# Patient Record
Sex: MALE | Race: BLACK OR AFRICAN AMERICAN | Employment: UNEMPLOYED | URBAN - METROPOLITAN AREA
[De-identification: names, ages, dates, MRNs, and addresses within clinical notes are randomized per-mention and may not be internally consistent; named-entity substitution may affect disease eponyms.]

---

## 2018-12-04 ENCOUNTER — OFFICE VISIT (OUTPATIENT)
Dept: FAMILY MEDICINE CLINIC | Age: 48
End: 2018-12-04

## 2018-12-04 VITALS
OXYGEN SATURATION: 99 % | HEIGHT: 66 IN | WEIGHT: 143.8 LBS | TEMPERATURE: 97.6 F | DIASTOLIC BLOOD PRESSURE: 74 MMHG | RESPIRATION RATE: 12 BRPM | SYSTOLIC BLOOD PRESSURE: 97 MMHG | HEART RATE: 67 BPM | BODY MASS INDEX: 23.11 KG/M2

## 2018-12-04 DIAGNOSIS — F32.9 REACTIVE DEPRESSION: ICD-10-CM

## 2018-12-04 DIAGNOSIS — N18.6 ESRD ON DIALYSIS (HCC): ICD-10-CM

## 2018-12-04 DIAGNOSIS — Z99.2 ESRD ON DIALYSIS (HCC): ICD-10-CM

## 2018-12-04 DIAGNOSIS — G47.09 OTHER INSOMNIA: Primary | ICD-10-CM

## 2018-12-04 RX ORDER — CALCIUM ACETATE 667 MG/1
1 CAPSULE ORAL
COMMUNITY
End: 2022-08-16 | Stop reason: SDUPTHER

## 2018-12-04 RX ORDER — MECLIZINE HYDROCHLORIDE 25 MG/1
TABLET ORAL
COMMUNITY
End: 2019-02-25 | Stop reason: SDUPTHER

## 2018-12-04 RX ORDER — OMEPRAZOLE 40 MG/1
40 CAPSULE, DELAYED RELEASE ORAL DAILY
COMMUNITY
End: 2019-02-25 | Stop reason: SDUPTHER

## 2018-12-04 RX ORDER — HYDRALAZINE HYDROCHLORIDE 25 MG/1
25 TABLET, FILM COATED ORAL 3 TIMES DAILY
COMMUNITY
End: 2019-02-25 | Stop reason: SDUPTHER

## 2018-12-04 RX ORDER — TRAZODONE HYDROCHLORIDE 50 MG/1
50 TABLET ORAL
Qty: 90 TAB | Refills: 1 | Status: SHIPPED | OUTPATIENT
Start: 2018-12-04 | End: 2020-04-09 | Stop reason: SDUPTHER

## 2018-12-04 RX ORDER — CALCITRIOL 0.25 UG/1
0.25 CAPSULE ORAL DAILY
COMMUNITY
End: 2021-11-11

## 2018-12-04 RX ORDER — METOPROLOL TARTRATE 25 MG/1
TABLET, FILM COATED ORAL 2 TIMES DAILY
COMMUNITY
End: 2019-02-25 | Stop reason: SDUPTHER

## 2018-12-04 NOTE — PATIENT INSTRUCTIONS
Begin trazodone nightly, take one tablet every night 1 hour prior to your desired bedtime. You may not notice it immediately, but soon you will be sleeping much better. Recheck as needed.

## 2018-12-04 NOTE — PROGRESS NOTES
HISTORY OF PRESENT ILLNESS  Nanci Styles is a 45 y.o. male. Mr. Carlos Florentino is here to establish care. His step father passed away earlier this year and in July he had to go on dialysis. All of this has been overwheling and he says he has trouble sleeping because of it. He is depressed about all of this but denies suicidal thoughts. He has decreased hearing both ears, will  Be seeing ENT soon. He sees a podiatrist in 3 days for foot pain, and he has stomach issues and has an appt with Dr. Clifford Lei coming up. Review of Systems   Constitutional: Negative for chills and fever. HENT: Positive for hearing loss. Negative for sore throat and tinnitus. Eyes: Negative for blurred vision and double vision. Respiratory: Negative for cough and shortness of breath. Cardiovascular: Negative for chest pain, palpitations and orthopnea. Gastrointestinal: Positive for abdominal pain. Negative for constipation, nausea and vomiting. Musculoskeletal:        Foot pain   Neurological: Negative for headaches. Psychiatric/Behavioral: Positive for depression. Negative for hallucinations and suicidal ideas. The patient has insomnia. The patient is not nervous/anxious. Physical Exam   Constitutional: He is oriented to person, place, and time. He appears well-developed and well-nourished. Eyes: Pupils are equal, round, and reactive to light. Neck: Neck supple. Cardiovascular: Normal rate, regular rhythm and normal heart sounds. Pulmonary/Chest: Effort normal and breath sounds normal.   Abdominal: Soft. He exhibits no distension. There is no tenderness. There is no rebound and no guarding. Lymphadenopathy:     He has no cervical adenopathy. Neurological: He is alert and oriented to person, place, and time. Nursing note and vitals reviewed. ASSESSMENT and PLAN    ICD-10-CM ICD-9-CM    1. Other insomnia G47.09 780.52    2. Reactive depression F32.9 300.4    3.  [facility] ([facility] N18.6 585.6     Z99.2 V45.11

## 2018-12-04 NOTE — PROGRESS NOTES
Rm 3  Patient presents to the clinic c/o needing something to help him sleep at night. Has not been able to sleep x 5 days ago. Depression Screening:  PHQ over the last two weeks 12/4/2018   Little interest or pleasure in doing things Several days   Feeling down, depressed, irritable, or hopeless Several days   Total Score PHQ 2 2       Learning Assessment:  Learning Assessment 12/4/2018   PRIMARY LEARNER Patient   HIGHEST LEVEL OF EDUCATION - PRIMARY LEARNER  DID NOT GRADUATE HIGH SCHOOL   BARRIERS PRIMARY LEARNER NONE   CO-LEARNER CAREGIVER No   PRIMARY LANGUAGE ENGLISH   LEARNER PREFERENCE PRIMARY DEMONSTRATION   ANSWERED BY patient   RELATIONSHIP SELF       Abuse Screening:  No flowsheet data found. Health Maintenance reviewed and discussed per provider: yes     Coordination of Care:    1. Have you been to the ER, urgent care clinic since your last visit? Hospitalized since your last visit? no    2. Have you seen or consulted any other health care providers outside of the [address] [place] [address] since your last visit? Include any pap smears or colon screening.  No

## 2019-02-05 ENCOUNTER — HOSPITAL ENCOUNTER (OUTPATIENT)
Dept: LAB | Age: 49
Discharge: HOME | End: 2019-02-05
Payer: MEDICARE

## 2019-02-05 ENCOUNTER — OFFICE VISIT (OUTPATIENT)
Dept: FAMILY MEDICINE CLINIC | Age: 49
End: 2019-02-05

## 2019-02-05 VITALS
BODY MASS INDEX: 22.47 KG/M2 | DIASTOLIC BLOOD PRESSURE: 62 MMHG | TEMPERATURE: 97.9 F | OXYGEN SATURATION: 100 % | HEART RATE: 64 BPM | WEIGHT: 139.8 LBS | RESPIRATION RATE: 12 BRPM | SYSTOLIC BLOOD PRESSURE: 99 MMHG | HEIGHT: 66 IN

## 2019-02-05 DIAGNOSIS — R29.898 WEAKNESS OF BOTH LEGS: ICD-10-CM

## 2019-02-05 DIAGNOSIS — Z99.2 ESRD ON DIALYSIS (HCC): Primary | ICD-10-CM

## 2019-02-05 DIAGNOSIS — Z11.59 NEED FOR HEPATITIS C SCREENING TEST: ICD-10-CM

## 2019-02-05 DIAGNOSIS — R68.89 COLD INTOLERANCE: ICD-10-CM

## 2019-02-05 DIAGNOSIS — G89.29 CHRONIC MIDLINE LOW BACK PAIN WITHOUT SCIATICA: ICD-10-CM

## 2019-02-05 DIAGNOSIS — N18.6 ESRD ON DIALYSIS (HCC): ICD-10-CM

## 2019-02-05 DIAGNOSIS — F17.200 SMOKER: ICD-10-CM

## 2019-02-05 DIAGNOSIS — M54.50 CHRONIC MIDLINE LOW BACK PAIN WITHOUT SCIATICA: ICD-10-CM

## 2019-02-05 DIAGNOSIS — N18.6 ESRD ON DIALYSIS (HCC): Primary | ICD-10-CM

## 2019-02-05 DIAGNOSIS — Z99.2 ESRD ON DIALYSIS (HCC): ICD-10-CM

## 2019-02-05 LAB
BASOPHILS # BLD: 0.1 K/UL (ref 0–0.1)
BASOPHILS NFR BLD: 1 % (ref 0–2)
DIFFERENTIAL METHOD BLD: ABNORMAL
EOSINOPHIL # BLD: 0.4 K/UL (ref 0–0.4)
EOSINOPHIL NFR BLD: 5 % (ref 0–5)
ERYTHROCYTE [DISTWIDTH] IN BLOOD BY AUTOMATED COUNT: 15.7 % (ref 11.6–14.5)
HCT VFR BLD AUTO: 36.5 % (ref 36–48)
HGB BLD-MCNC: 11.8 G/DL (ref 13–16)
LYMPHOCYTES # BLD: 3 K/UL (ref 0.9–3.6)
LYMPHOCYTES NFR BLD: 31 % (ref 21–52)
MCH RBC QN AUTO: 30.2 PG (ref 24–34)
MCHC RBC AUTO-ENTMCNC: 32.3 G/DL (ref 31–37)
MCV RBC AUTO: 93.4 FL (ref 74–97)
MONOCYTES # BLD: 0.7 K/UL (ref 0.05–1.2)
MONOCYTES NFR BLD: 7 % (ref 3–10)
NEUTS SEG # BLD: 5.4 K/UL (ref 1.8–8)
NEUTS SEG NFR BLD: 56 % (ref 40–73)
PLATELET # BLD AUTO: 209 K/UL (ref 135–420)
PMV BLD AUTO: 9.9 FL (ref 9.2–11.8)
RBC # BLD AUTO: 3.91 M/UL (ref 4.7–5.5)
WBC # BLD AUTO: 9.6 K/UL (ref 4.6–13.2)

## 2019-02-05 PROCEDURE — 86803 HEPATITIS C AB TEST: CPT

## 2019-02-05 PROCEDURE — 85025 COMPLETE CBC W/AUTO DIFF WBC: CPT

## 2019-02-05 PROCEDURE — 80053 COMPREHEN METABOLIC PANEL: CPT

## 2019-02-05 PROCEDURE — 84443 ASSAY THYROID STIM HORMONE: CPT

## 2019-02-05 PROCEDURE — 84439 ASSAY OF FREE THYROXINE: CPT

## 2019-02-05 NOTE — PATIENT INSTRUCTIONS
We will call you about the lab results tomorrow. For the back, I will prescribe an analgesic cream that you apply to the painful area 2-3 times per day. If your insurance doesn't cover that we will try to get analgesic patchs. YOU NEED TO QUIT SMOKING. THIS IS AFFECTING YOUR ENDURANCE, LUNGS (SHORTNESS OF BREATH). YOU NEED TO DO THIS MORE THAN YOU NEED OXYGEN. I have prescribed the walker and the shower chair. I want you to get exercise, that's why the walker with a chair is a good idea. Schedule a \"medicare Wellness\" visit. This is not a regular physician office visit, this is all about updating your recommended health maintenance exams

## 2019-02-06 ENCOUNTER — TELEPHONE (OUTPATIENT)
Dept: FAMILY MEDICINE CLINIC | Age: 49
End: 2019-02-06

## 2019-02-06 DIAGNOSIS — E83.51 HYPOCALCEMIA: ICD-10-CM

## 2019-02-06 DIAGNOSIS — E87.5 HYPERKALEMIA: Primary | ICD-10-CM

## 2019-02-06 LAB
ALBUMIN SERPL-MCNC: 3.5 G/DL (ref 3.4–5)
ALBUMIN/GLOB SERPL: 1.1 {RATIO} (ref 0.8–1.7)
ALP SERPL-CCNC: 137 U/L (ref 45–117)
ALT SERPL-CCNC: 14 U/L (ref 16–61)
ANION GAP SERPL CALC-SCNC: 14 MMOL/L (ref 3–18)
AST SERPL-CCNC: 13 U/L (ref 15–37)
BILIRUB SERPL-MCNC: 0.4 MG/DL (ref 0.2–1)
BUN SERPL-MCNC: 95 MG/DL (ref 7–18)
BUN/CREAT SERPL: 5 (ref 12–20)
CALCIUM SERPL-MCNC: 6.8 MG/DL (ref 8.5–10.1)
CHLORIDE SERPL-SCNC: 98 MMOL/L (ref 100–108)
CO2 SERPL-SCNC: 22 MMOL/L (ref 21–32)
CREAT SERPL-MCNC: 21.1 MG/DL (ref 0.6–1.3)
GLOBULIN SER CALC-MCNC: 3.3 G/DL (ref 2–4)
GLUCOSE SERPL-MCNC: 105 MG/DL (ref 74–99)
HCV AB SER IA-ACNC: 0.02 INDEX
HCV AB SERPL QL IA: NEGATIVE
HCV COMMENT,HCGAC: NORMAL
POTASSIUM SERPL-SCNC: 7.1 MMOL/L (ref 3.5–5.1)
PROT SERPL-MCNC: 6.8 G/DL (ref 6.4–8.2)
SODIUM SERPL-SCNC: 134 MMOL/L (ref 136–145)
T4 FREE SERPL-MCNC: 0.9 NG/DL (ref 0.7–1.5)
TSH SERPL DL<=0.05 MIU/L-ACNC: 0.58 UIU/ML (ref 0.36–3.74)

## 2019-02-06 NOTE — TELEPHONE ENCOUNTER
Called with critical K+ level of 7.1.   Roseann Briones called him, advised him to go to lab and get repeat today since he doesn't have dialysis today

## 2019-02-06 NOTE — PROGRESS NOTES
Advise pt that his potassium level is very high, he needs to have it repeated. Is he going to dialysis today? He needs to have his kidney doctor repeat the labs and review these.  If he doesn't have dialysis, he needs to go to the hospital for repeat

## 2019-02-06 NOTE — PROGRESS NOTES
Called patient to inform about lab results. His sister answered, but looks like she is not on the release of information form so I let her know if he can give us a call when he gets back in the house. She stated she will do that due to the fact that they are waiting on the lab results.

## 2019-02-06 NOTE — PROGRESS NOTES
HISTORY OF PRESENT ILLNESS Carmen Conner is a 45 y.o. male. Mr. Lety Andrade is here with his sister asking for several things. He says he wants oxygen because he is short of breath, although his sats are 100%. He also smokes and refuses to quit. He c/o leg weakness, this has been going on for a long time, but seems to be getting worse. For this he wants a wheelchair and a chair for his shower. Finally, he says he is cold all the time and wants iron pills or something Review of Systems Constitutional: Negative for chills and fever. Eyes: Negative for blurred vision and double vision. Respiratory: Positive for shortness of breath. Cardiovascular: Negative for chest pain and palpitations. Gastrointestinal: Negative for abdominal pain, nausea and vomiting. Neurological: Positive for weakness. Negative for tingling, speech change and headaches. Endo/Heme/Allergies:  
     Cold intolerance Physical Exam  
Constitutional: He is oriented to person, place, and time. He appears well-developed and well-nourished. Eyes: Pupils are equal, round, and reactive to light. Neck: Neck supple. Cardiovascular: Normal rate and regular rhythm. Pulmonary/Chest: Effort normal and breath sounds normal. No respiratory distress. He has no wheezes. He has no rales. Abdominal: Soft. There is no tenderness. Musculoskeletal:  
Walks slowly, a little unsteady. Does appear to have leg weakness Lymphadenopathy:  
  He has no cervical adenopathy. Neurological: He is alert and oriented to person, place, and time. Psychiatric: He has a normal mood and affect. His behavior is normal.  
Nursing note and vitals reviewed. ASSESSMENT and PLAN 
  ICD-10-CM ICD-9-CM 1. ESRD on dialysis ([facility] N18.6 585.6 CBC WITH AUTOMATED DIFF  
 S87.7 C24.32 METABOLIC PANEL, COMPREHENSIVE 2. Chronic midline low back pain without sciatica M54.5 724.2  G89.29 338.29   
 3. Weakness of both legs R29.898 729.89 CBC WITH AUTOMATED DIFF 4. Smoker F17.200 305.1 CBC WITH AUTOMATED DIFF 5. Cold intolerance R68.89 780.99 CBC WITH AUTOMATED DIFF  
   METABOLIC PANEL, COMPREHENSIVE  
   TSH 3RD GENERATION  
   T4, FREE 6.  Need for hepatitis C screening test Z11.59 V73.89 HEPATITIS C AB

## 2019-02-06 NOTE — PROGRESS NOTES
Patient calling back. I let him know that his potassium level came back very high and will need to have it redone. Asked him if going to dialysis today but patient stated no, tomorrow. So advised patient to go to the hospital and have them repeat his potassium level before going to dialysis. Patient verbalized understanding and had no further questions.

## 2019-02-14 ENCOUNTER — TELEPHONE (OUTPATIENT)
Dept: FAMILY MEDICINE CLINIC | Age: 49
End: 2019-02-14

## 2019-02-14 NOTE — TELEPHONE ENCOUNTER
Stated that they need an order for a shower chair and walker sent to [facility]. Gave the fax number of [phone]. Also mentioned that the pain ointment/cream he was given, it did not get approved by his insurance so was wondering if there was any other cream he can be given.

## 2019-02-18 ENCOUNTER — TELEPHONE (OUTPATIENT)
Dept: FAMILY MEDICINE CLINIC | Age: 49
End: 2019-02-18

## 2019-02-18 DIAGNOSIS — M54.50 CHRONIC MIDLINE LOW BACK PAIN WITHOUT SCIATICA: ICD-10-CM

## 2019-02-18 DIAGNOSIS — G89.29 CHRONIC MIDLINE LOW BACK PAIN WITHOUT SCIATICA: ICD-10-CM

## 2019-02-18 DIAGNOSIS — R29.898 WEAKNESS OF BOTH LEGS: Primary | ICD-10-CM

## 2019-02-18 RX ORDER — LIDOCAINE 50 MG/G
PATCH TOPICAL
Qty: 30 EACH | Refills: 5 | Status: SHIPPED | OUTPATIENT
Start: 2019-02-18 | End: 2019-02-21 | Stop reason: SDUPTHER

## 2019-02-18 NOTE — TELEPHONE ENCOUNTER
Pt needs a shower chair script sent to [facility]. Pt also needs a different pain cream called in, pervious one not covered by insurance.

## 2019-02-20 ENCOUNTER — DOCUMENTATION ONLY (OUTPATIENT)
Dept: FAMILY MEDICINE CLINIC | Age: 49
End: 2019-02-20

## 2019-02-20 NOTE — PROGRESS NOTES
Faxed script for shower chair, demo sheet, and offie notes to [employer].      [employer] will be getting in contact with the patient

## 2019-02-21 RX ORDER — LIDOCAINE 50 MG/G
PATCH TOPICAL
Qty: 30 EACH | Refills: 5 | Status: SHIPPED | OUTPATIENT
Start: 2019-02-21 | End: 2019-02-27 | Stop reason: SDUPTHER

## 2019-02-21 NOTE — TELEPHONE ENCOUNTER
Requested Prescriptions     Pending Prescriptions Disp Refills    lidocaine (LIDODERM) 5 % 30 Each 5     Sig: Apply patch to the affected area for 12 hours a day and remove for 12 hours a day.

## 2019-02-26 RX ORDER — CALCITRIOL 0.25 UG/1
0.25 CAPSULE ORAL DAILY
OUTPATIENT
Start: 2019-02-26

## 2019-02-26 RX ORDER — METOPROLOL TARTRATE 25 MG/1
25 TABLET, FILM COATED ORAL 2 TIMES DAILY
Qty: 180 TAB | Refills: 1 | Status: SHIPPED | OUTPATIENT
Start: 2019-02-26 | End: 2019-09-05 | Stop reason: SDUPTHER

## 2019-02-26 RX ORDER — CALCIUM ACETATE 667 MG/1
CAPSULE ORAL
OUTPATIENT
Start: 2019-02-26

## 2019-02-26 RX ORDER — MECLIZINE HYDROCHLORIDE 25 MG/1
25 TABLET ORAL
Qty: 90 TAB | Refills: 0 | Status: SHIPPED | OUTPATIENT
Start: 2019-02-26 | End: 2019-09-05 | Stop reason: SDUPTHER

## 2019-02-26 RX ORDER — HYDRALAZINE HYDROCHLORIDE 25 MG/1
25 TABLET, FILM COATED ORAL 3 TIMES DAILY
Qty: 270 TAB | Refills: 1 | Status: SHIPPED | OUTPATIENT
Start: 2019-02-26 | End: 2019-09-05 | Stop reason: SDUPTHER

## 2019-02-26 RX ORDER — OMEPRAZOLE 40 MG/1
40 CAPSULE, DELAYED RELEASE ORAL DAILY
Qty: 90 CAP | Refills: 1 | Status: SHIPPED | OUTPATIENT
Start: 2019-02-26 | End: 2019-09-05 | Stop reason: SDUPTHER

## 2019-02-26 NOTE — TELEPHONE ENCOUNTER
I did not prescribe the medications that his kidney doctor prescribes for him (Rocaltrol and Phoslo)

## 2019-02-27 ENCOUNTER — TELEPHONE (OUTPATIENT)
Dept: FAMILY MEDICINE CLINIC | Age: 49
End: 2019-02-27

## 2019-02-27 RX ORDER — CALCITRIOL 0.25 UG/1
0.25 CAPSULE ORAL DAILY
OUTPATIENT
Start: 2019-02-27

## 2019-02-27 RX ORDER — CALCIUM ACETATE 667 MG/1
CAPSULE ORAL
OUTPATIENT
Start: 2019-02-27

## 2019-02-27 RX ORDER — HYDRALAZINE HYDROCHLORIDE 25 MG/1
25 TABLET, FILM COATED ORAL 3 TIMES DAILY
Qty: 270 TAB | Refills: 1 | Status: CANCELLED | OUTPATIENT
Start: 2019-02-27

## 2019-02-27 RX ORDER — LIDOCAINE 50 MG/G
PATCH TOPICAL
Qty: 30 EACH | Refills: 5 | Status: SHIPPED | OUTPATIENT
Start: 2019-02-27 | End: 2021-11-11 | Stop reason: SDUPTHER

## 2019-02-27 NOTE — TELEPHONE ENCOUNTER
Requested Prescriptions     Pending Prescriptions Disp Refills    lidocaine (LIDODERM) 5 % 30 Each 5     Sig: Apply patch to the affected area for 12 hours a day and remove for 12 hours a day.         CHANGED PHARMACY

## 2019-03-01 RX ORDER — CALCIUM ACETATE 667 MG/1
CAPSULE ORAL
OUTPATIENT
Start: 2019-03-01

## 2019-03-01 RX ORDER — CALCITRIOL 0.25 UG/1
0.25 CAPSULE ORAL DAILY
OUTPATIENT
Start: 2019-03-01

## 2019-04-26 ENCOUNTER — OFFICE VISIT (OUTPATIENT)
Dept: INTERNAL MEDICINE CLINIC | Age: 49
End: 2019-04-26

## 2019-04-26 ENCOUNTER — DOCUMENTATION ONLY (OUTPATIENT)
Dept: INTERNAL MEDICINE CLINIC | Age: 49
End: 2019-04-26

## 2019-04-26 VITALS
TEMPERATURE: 98.8 F | DIASTOLIC BLOOD PRESSURE: 75 MMHG | HEART RATE: 55 BPM | OXYGEN SATURATION: 98 % | BODY MASS INDEX: 23.14 KG/M2 | WEIGHT: 144 LBS | HEIGHT: 66 IN | SYSTOLIC BLOOD PRESSURE: 121 MMHG | RESPIRATION RATE: 14 BRPM

## 2019-04-26 DIAGNOSIS — Z12.5 SPECIAL SCREENING FOR MALIGNANT NEOPLASM OF PROSTATE: ICD-10-CM

## 2019-04-26 DIAGNOSIS — F17.210 CIGARETTE SMOKER: ICD-10-CM

## 2019-04-26 DIAGNOSIS — Z13.6 SCREENING FOR ISCHEMIC HEART DISEASE: ICD-10-CM

## 2019-04-26 DIAGNOSIS — Z12.11 COLON CANCER SCREENING: ICD-10-CM

## 2019-04-26 DIAGNOSIS — N18.6 ESRD ON DIALYSIS (HCC): Primary | ICD-10-CM

## 2019-04-26 DIAGNOSIS — Z99.2 ESRD ON DIALYSIS (HCC): Primary | ICD-10-CM

## 2019-04-26 RX ORDER — IBUPROFEN 200 MG
1 TABLET ORAL EVERY 24 HOURS
Qty: 60 PATCH | Refills: 0 | Status: SHIPPED | OUTPATIENT
Start: 2019-04-26 | End: 2021-11-11 | Stop reason: SDUPTHER

## 2019-04-26 RX ORDER — CALCIUM ACETATE 667 MG/1
CAPSULE ORAL
COMMUNITY
End: 2019-04-26

## 2019-04-26 NOTE — PROGRESS NOTES
HISTORY OF PRESENT ILLNESS  Christopher Mcguire is a 45 y.o. male. HPI  Mr. Daniela Kerr presents to establish care from [facility].   1) ESRD on dialysis - since July 2018. He reports he saw a kidney specialist at the hospital but he has not seen one since. - Follows with [facility] [address] - appt thursday 6/27/19. 2) Hearing loss - working with [facility] to obtain hearing ids. 3) Follows with podiatry for right foot callus. 4) Health maintenance: reports pneumonia vaccine obtained at dialysis center. He believes Tetanus is UTD. Review of Systems   Constitutional:        Feeling well. No c/o. Visit Vitals  /75 (BP 1 Location: Left arm, BP Patient Position: Sitting)   Pulse (!) 55   Temp 98.8 °F (37.1 °C) (Oral)   Resp 14   Ht 5' 6\" (1.676 m)   Wt 144 lb (65.3 kg)   SpO2 98%   BMI 23.24 kg/m²       Physical Exam   Constitutional: He is oriented to person, place, and time. He appears well-developed and well-nourished. No distress. HENT:   Head: Normocephalic and atraumatic. Right Ear: Tympanic membrane, external ear and ear canal normal.   Left Ear: Tympanic membrane, external ear and ear canal normal.   Nose: Nose normal.   Mouth/Throat: Uvula is midline, oropharynx is clear and moist and mucous membranes are normal. No oropharyngeal exudate, posterior oropharyngeal edema, posterior oropharyngeal erythema or tonsillar abscesses. Eyes: Pupils are equal, round, and reactive to light. Conjunctivae are normal. No scleral icterus. Neck: Neck supple. Cardiovascular: Normal rate, regular rhythm and normal heart sounds. Exam reveals no gallop. No murmur heard. Pulses:       Dorsalis pedis pulses are 2+ on the right side, and 2+ on the left side. Posterior tibial pulses are 2+ on the right side, and 2+ on the left side. No pedal edema. Pulmonary/Chest: Effort normal and breath sounds normal. No respiratory distress. He has no decreased breath sounds. He has no wheezes.  He has no rhonchi. He has no rales. Lymphadenopathy:        Head (right side): No submandibular and no tonsillar adenopathy present. Head (left side): No submandibular and no tonsillar adenopathy present. He has no cervical adenopathy. Right: No supraclavicular adenopathy present. Left: No supraclavicular adenopathy present. Neurological: He is alert and oriented to person, place, and time. Skin: Skin is warm and dry. Psychiatric: He has a normal mood and affect. His speech is normal.       ASSESSMENT and PLAN  Diagnoses and all orders for this visit:    1. ESRD on dialysis ([facility]  -     REFERRAL TO NEPHROLOGY    2. Cigarette smoker  -     nicotine (NICODERM CQ) 21 mg/24 hr; 1 Patch by TransDERmal route every twenty-four (24) hours. - Advised of total smoking cessation. He smokes ~6 cigs / day and is agreeable to cessation. Usage / AE's discussed. He would like to start max dosage. Do 1-2 months at this dosage, then we can decrease to medium dosage. Encouragement provided. - 5 minutes devoted to smoking cessation. 3. Colon cancer screening  -     OCCULT BLOOD IMMUNOASSAY,DIAGNOSTIC; Future    4. Screening for ischemic heart disease  -     LIPID PANEL; Future    5. Special screening for malignant neoplasm of prostate  -     PSA SCREENING (SCREENING); Future      Return for fasting lab. Patient Instructions   Check on your tetanus status and whether you have received this or not.

## 2019-04-29 ENCOUNTER — TELEPHONE (OUTPATIENT)
Dept: INTERNAL MEDICINE CLINIC | Age: 49
End: 2019-04-29

## 2019-07-12 ENCOUNTER — OFFICE VISIT (OUTPATIENT)
Dept: INTERNAL MEDICINE CLINIC | Age: 49
End: 2019-07-12

## 2019-07-12 VITALS
HEART RATE: 56 BPM | BODY MASS INDEX: 22.18 KG/M2 | HEIGHT: 66 IN | SYSTOLIC BLOOD PRESSURE: 187 MMHG | RESPIRATION RATE: 16 BRPM | TEMPERATURE: 98.6 F | OXYGEN SATURATION: 100 % | WEIGHT: 138 LBS | DIASTOLIC BLOOD PRESSURE: 103 MMHG

## 2019-07-12 DIAGNOSIS — N63.20 LEFT BREAST MASS: Primary | ICD-10-CM

## 2019-07-12 DIAGNOSIS — N62 GYNECOMASTIA, MALE: ICD-10-CM

## 2019-07-12 DIAGNOSIS — I10 ESSENTIAL HYPERTENSION: ICD-10-CM

## 2019-07-12 NOTE — PROGRESS NOTES
HISTORY OF PRESENT ILLNESS  Gustavo Jean is a 46 y.o. male. HPI  Mr. Cris Abbott presents for a left breast mass x 1 week. It is painful and just posterior to the nipple. Denies nipple discharge. - No new medications. No TRT. 2) HTN - uncontrolled. He reports he has been advised by his dialysis clinic to not take BP medication on his days of dialysis. He has not taken it today. Current Outpatient Medications   Medication Sig Dispense Refill    lidocaine (LIDODERM) 5 % Apply patch to the affected area for 12 hours a day and remove for 12 hours a day. 30 Each 5    hydrALAZINE (APRESOLINE) 25 mg tablet Take 1 Tab by mouth three (3) times daily. 270 Tab 1    meclizine (ANTIVERT) 25 mg tablet Take 1 Tab by mouth three (3) times daily as needed. 90 Tab 0    metoprolol tartrate (LOPRESSOR) 25 mg tablet Take 1 Tab by mouth two (2) times a day. 180 Tab 1    omeprazole (PRILOSEC) 40 mg capsule Take 1 Cap by mouth daily. 90 Cap 1    calcitRIOL (ROCALTROL) 0.25 mcg capsule Take 0.25 mcg by mouth daily.  calcium acetate (PHOSLO) 667 mg cap Take 1 Cap by mouth three (3) times daily (with meals).  traZODone (DESYREL) 50 mg tablet Take 1 Tab by mouth nightly. 90 Tab 1    nicotine (NICODERM CQ) 21 mg/24 hr 1 Patch by TransDERmal route every twenty-four (24) hours. 60 Patch 0         Review of Systems   Neurological: Negative for dizziness and headaches. Visit Vitals  BP (!) 187/103 (BP 1 Location: Left arm, BP Patient Position: Sitting)   Pulse (!) 56   Temp 98.6 °F (37 °C) (Oral)   Resp 16   Ht 5' 6\" (1.676 m)   Wt 138 lb (62.6 kg)   SpO2 100%   BMI 22.27 kg/m²   - BP repeated manually with consistent reading to above. Physical Exam   Constitutional: He is oriented to person, place, and time. He appears well-developed and well-nourished. No distress. HENT:   Head: Normocephalic and atraumatic.    Right Ear: Tympanic membrane, external ear and ear canal normal.   Left Ear: Tympanic membrane, external ear and ear canal normal.   Nose: Nose normal.   Mouth/Throat: Uvula is midline, oropharynx is clear and moist and mucous membranes are normal. No oropharyngeal exudate, posterior oropharyngeal edema, posterior oropharyngeal erythema or tonsillar abscesses. Eyes: Pupils are equal, round, and reactive to light. Conjunctivae are normal. No scleral icterus. Neck: Neck supple. Cardiovascular: Normal rate, regular rhythm and normal heart sounds. Exam reveals no gallop. No murmur heard. Pulses:       Dorsalis pedis pulses are 2+ on the right side, and 2+ on the left side. Posterior tibial pulses are 2+ on the right side, and 2+ on the left side. No pedal edema. Pulmonary/Chest: Effort normal and breath sounds normal. No respiratory distress. He has no decreased breath sounds. He has no wheezes. He has no rhonchi. He has no rales. Right breast exhibits no nipple discharge. Left breast exhibits no nipple discharge. Breasts are asymmetrical.   Left breast with symmetrical glandular tissue palpated just posterior to areola. Not appreciated on the right breast to any degree. Genitourinary: Right testis shows no mass and no tenderness. Left testis shows no mass and no tenderness. Genitourinary Comments: No palpable testicular mass. Patient's female  remains present in room during exam.    Lymphadenopathy:        Head (right side): No submandibular and no tonsillar adenopathy present. Head (left side): No submandibular and no tonsillar adenopathy present. He has no cervical adenopathy. Right: No supraclavicular adenopathy present. Left: No supraclavicular adenopathy present. Neurological: He is alert and oriented to person, place, and time. Skin: Skin is warm and dry. Psychiatric: He has a normal mood and affect. His speech is normal.       ASSESSMENT and PLAN  Diagnoses and all orders for this visit:    1. Left breast mass  -     KARINA MAMMO LT DX INCL CAD;  Future  - US BREAST LT LIMITED=<3 QUAD; Future    2. Gynecomastia, male  -     KARINA MAMMO LT DX INCL CAD; Future  -     US BREAST LT LIMITED=<3 QUAD; Future  -     HCG QL SERUM; Future  -     FOLLICLE STIMULATING HORMONE; Future  -     LUTEINIZING HORMONE; Future  -     TESTOSTERONE, TOTAL, ADULT MALE; Future  -     ESTRADIOL; Future    3. Essential hypertension  - Patient advised to take medication when he gets home today. - Also advised to confirm BP med instructions with nephrologist. He will check on this. Follow-up and Dispositions    · Return in about 2 weeks (around 7/26/2019) for follow-up results.

## 2019-07-12 NOTE — PROGRESS NOTES
ROOM # 11    Oniel Condon presents today for   Chief Complaint   Patient presents with    Breast Mass     left x 1 week       Oniel Condon preferred language for health care discussion is english/other. Is someone accompanying this pt? Yes mother    Is the patient using any DME equipment during [address]? No    Depression Screening:  3 most recent [facility] Screens 7/12/2019 4/26/2019 2/5/2019 12/4/2018   Little interest or pleasure in doing things Not at all Not at all Not at all Several days   Feeling down, depressed, irritable, or hopeless Not at all Not at all Not at all Several days   Total Score PHQ 2 0 0 0 2       Learning Assessment:  Learning Assessment 4/26/2019 12/4/2018   PRIMARY LEARNER Patient Patient   HIGHEST LEVEL OF EDUCATION - PRIMARY LEARNER  DID NOT GRADUATE HIGH SCHOOL DID NOT GRADUATE HIGH SCHOOL   BARRIERS PRIMARY LEARNER NONE NONE   CO-LEARNER CAREGIVER No No   PRIMARY LANGUAGE ENGLISH ENGLISH   LEARNER PREFERENCE PRIMARY LISTENING DEMONSTRATION   ANSWERED BY patient patient   RELATIONSHIP SELF SELF       Abuse Screening:  No flowsheet data found. Fall Risk  No flowsheet data found. Health Maintenance reviewed and discussed per provider. Yes    Oniel Condon is due for   Health Maintenance Due   Topic Date Due    Pneumococcal 0-64 years (1 of 3 - PCV13) 10/04/1974    DTaP/Tdap/Td series (1 - Tdap) 10/04/1989    Shingrix Vaccine Age 50> (1 of 2) 10/04/2018    FOBT Q 1 YEAR AGE 50-75  10/04/2018    MEDICARE YEARLY EXAM  12/04/2018     Please order/place referral if appropriate. Advance Directive:  1. Do you have an advance directive in place? Patient Reply: NO    2. If not, would you like material regarding how to put one in place? Patient Reply: NO    Coordination of Care:  1. Have you been to the ER, urgent care clinic since your last visit? Hospitalized since your last visit? NO    2.  Have you seen or consulted any other health care providers outside of the [address] [place] [address] since your last visit? Include any pap smears or colon screening.  NO

## 2019-07-18 ENCOUNTER — HOSPITAL ENCOUNTER (OUTPATIENT)
Dept: LAB | Age: 49
Discharge: HOME | End: 2019-07-18
Payer: MEDICARE

## 2019-07-18 DIAGNOSIS — N62 GYNECOMASTIA, MALE: ICD-10-CM

## 2019-07-18 LAB — HCG SERPL QL: NEGATIVE

## 2019-07-18 PROCEDURE — 83002 ASSAY OF GONADOTROPIN (LH): CPT

## 2019-07-18 PROCEDURE — 84403 ASSAY OF TOTAL TESTOSTERONE: CPT

## 2019-07-18 PROCEDURE — 83001 ASSAY OF GONADOTROPIN (FSH): CPT

## 2019-07-18 PROCEDURE — 36415 COLL VENOUS BLD VENIPUNCTURE: CPT

## 2019-07-18 PROCEDURE — 82670 ASSAY OF TOTAL ESTRADIOL: CPT

## 2019-07-18 PROCEDURE — 84703 CHORIONIC GONADOTROPIN ASSAY: CPT

## 2019-07-19 LAB
ESTRADIOL SERPL-MCNC: 26.3 PG/ML (ref 7.6–42.6)
TESTOST SERPL-MCNC: 658 NG/DL (ref 264–916)

## 2019-07-20 LAB
FSH SERPL-ACNC: 12 MIU/ML
LH SERPL-ACNC: 20.5 MIU/ML

## 2019-07-22 ENCOUNTER — TELEPHONE (OUTPATIENT)
Dept: INTERNAL MEDICINE CLINIC | Age: 49
End: 2019-07-22

## 2019-07-22 DIAGNOSIS — N62 GYNECOMASTIA: Primary | ICD-10-CM

## 2019-07-22 DIAGNOSIS — D49.7 NEOPLASM OF UNSPECIFIED BEHAVIOR OF ENDOCRINE GLANDS AND OTHER PARTS OF NERVOUS SYSTEM: ICD-10-CM

## 2019-07-22 NOTE — PROGRESS NOTES
Need labs to be ordered in [facility], so they can be faxed over.

## 2019-07-23 NOTE — TELEPHONE ENCOUNTER
----- Message from Asher Myers sent at 7/22/2019  4:34 PM EDT -----  Need labs to be ordered in [facility], so they can be faxed over.

## 2019-08-02 ENCOUNTER — OFFICE VISIT (OUTPATIENT)
Dept: INTERNAL MEDICINE CLINIC | Age: 49
End: 2019-08-02

## 2019-08-02 ENCOUNTER — HOSPITAL ENCOUNTER (OUTPATIENT)
Dept: LAB | Age: 49
Discharge: HOME | End: 2019-08-02
Payer: MEDICARE

## 2019-08-02 VITALS
RESPIRATION RATE: 16 BRPM | TEMPERATURE: 98.4 F | BODY MASS INDEX: 21.38 KG/M2 | WEIGHT: 133 LBS | DIASTOLIC BLOOD PRESSURE: 88 MMHG | SYSTOLIC BLOOD PRESSURE: 166 MMHG | HEIGHT: 66 IN | OXYGEN SATURATION: 98 % | HEART RATE: 67 BPM

## 2019-08-02 DIAGNOSIS — Z12.5 PROSTATE CANCER SCREENING: ICD-10-CM

## 2019-08-02 DIAGNOSIS — N62 GYNECOMASTIA: Primary | ICD-10-CM

## 2019-08-02 DIAGNOSIS — R63.4 ABNORMAL WEIGHT LOSS: ICD-10-CM

## 2019-08-02 PROCEDURE — 87389 HIV-1 AG W/HIV-1&-2 AB AG IA: CPT

## 2019-08-02 PROCEDURE — 84443 ASSAY THYROID STIM HORMONE: CPT

## 2019-08-02 PROCEDURE — 84153 ASSAY OF PSA TOTAL: CPT

## 2019-08-02 PROCEDURE — 84439 ASSAY OF FREE THYROXINE: CPT

## 2019-08-02 NOTE — PATIENT INSTRUCTIONS
Call [facility] at [phone] and ask for Central Scheduling so that you can schedule the mammogram and ultrasound.

## 2019-08-02 NOTE — PROGRESS NOTES
HISTORY OF PRESENT ILLNESS  Sarthak Mcdonald is a 46 y.o. male. HPI  Mr. Austyn Bell presents for follow-up gynecomastia and lab results. He also c/o weight loss as of late. He has been advised to consume \"Booster drinks\" via his dialysis clinic. 1) Gynecomastia - improved discomfort of left breast.  - Elevated LH with elevated T testosterone. Negative HCG. No estradiol elevation.  - Has not yet completed mammogram/breast US. Results for orders placed or performed during the hospital encounter of 07/18/19   HCG QL SERUM   Result Value Ref Range    HCG, Ql. NEGATIVE      FOLLICLE STIMULATING HORMONE   Result Value Ref Range    FSH 12.0 mIU/mL   LUTEINIZING HORMONE   Result Value Ref Range    Luteinizing hormone 20.5 mIU/mL   TESTOSTERONE, TOTAL, ADULT MALE   Result Value Ref Range    Testosterone 658 264 - 916 ng/dL   ESTRADIOL   Result Value Ref Range    Estradiol 26.3 7.6 - 42.6 pg/mL       2) Weight loss - c/o decreased appetite. Denies chronic cough or persistent pain. 3) HTN - med not taken today d/t dialysis. Review of Systems   Constitutional: Positive for weight loss. Respiratory: Negative for cough. Visit Vitals  /88 (BP 1 Location: Right arm, BP Patient Position: Sitting)   Pulse 67   Temp 98.4 °F (36.9 °C) (Oral)   Resp 16   Ht 5' 6\" (1.676 m)   Wt 133 lb (60.3 kg)   SpO2 98%   BMI 21.47 kg/m²     Wt Readings from Last 3 Encounters:   08/02/19 133 lb (60.3 kg)   07/12/19 138 lb (62.6 kg)   04/26/19 144 lb (65.3 kg)   2/5/2019 - 139 lbs  12/4/2018 - 143 lbs    BP Readings from Last 3 Encounters:   08/02/19 166/88   07/12/19 (!) 187/103   04/26/19 121/75       Physical Exam   Constitutional: He is oriented to person, place, and time. He appears well-developed and well-nourished. No distress. HENT:   Head: Normocephalic and atraumatic.    Right Ear: Tympanic membrane, external ear and ear canal normal.   Left Ear: Tympanic membrane, external ear and ear canal normal.   Nose: Nose normal. Mouth/Throat: Uvula is midline, oropharynx is clear and moist and mucous membranes are normal. No oropharyngeal exudate, posterior oropharyngeal edema, posterior oropharyngeal erythema or tonsillar abscesses. Eyes: Pupils are equal, round, and reactive to light. Conjunctivae are normal. No scleral icterus. Neck: Neck supple. Cardiovascular: Normal rate, regular rhythm and normal heart sounds. Exam reveals no gallop. No murmur heard. Pulses:       Dorsalis pedis pulses are 2+ on the right side, and 2+ on the left side. Posterior tibial pulses are 2+ on the right side, and 2+ on the left side. No pedal edema. Pulmonary/Chest: Effort normal and breath sounds normal. No respiratory distress. He has no decreased breath sounds. He has no wheezes. He has no rhonchi. He has no rales. Left breast with symmetrical glandular tissue palpated just posterior to areola - seems mildly smaller than previous. Less TTP. No similar tissue appreciated R breast.   Lymphadenopathy:        Head (right side): No submandibular and no tonsillar adenopathy present. Head (left side): No submandibular and no tonsillar adenopathy present. He has no cervical adenopathy. Right: No supraclavicular adenopathy present. Left: No supraclavicular adenopathy present. Neurological: He is alert and oriented to person, place, and time. Skin: Skin is warm and dry. Psychiatric: He has a normal mood and affect. His speech is normal.       ASSESSMENT and PLAN  Diagnoses and all orders for this visit:    1. Gynecomastia  -     TSH 3RD GENERATION; Future  -     T4, FREE; Future   - Negative eval 2/2019 but will repeat. -     US SCROTUM/TESTICLES; Future  -     REFERRAL TO ENDOCRINOLOGY  - Complete mammogram/US - orders placed last visit. [facility] contact info given to schedule. 2. Abnormal weight loss   -     TSH 3RD GENERATION; Future  -     PSA SCREENING (SCREENING);  Future  -     HIV 1/2 AG/AB, 4TH GENERATION,W RFLX CONFIRM; Future  - T4, FREE; Future  - Proceed with recommend \"booster\" drinks via dialysis clinic. Closely monitor. Advised against OTC products as he must be careful to avoid excessive protein consumption. He agrees. Patient Instructions   Call [facility] at [phone] and ask for Central Scheduling so that you can schedule the mammogram and ultrasound.

## 2019-08-02 NOTE — PROGRESS NOTES
1. Have you been to the ER, urgent care clinic since your last visit? Hospitalized since your last visit? No    2. Have you seen or consulted any other health care providers outside of the [address] [place] [address] since your last visit? Include any pap smears or colon screening. No     Patient presents in office today for routine care.   Patient concerns: loss of appetite, lab results

## 2019-08-05 ENCOUNTER — HOSPITAL ENCOUNTER (OUTPATIENT)
Dept: LAB | Age: 49
Discharge: HOME | End: 2019-08-05

## 2019-08-05 DIAGNOSIS — N62 GYNECOMASTIA: ICD-10-CM

## 2019-08-05 DIAGNOSIS — R63.4 ABNORMAL WEIGHT LOSS: ICD-10-CM

## 2019-08-05 LAB
PSA SERPL-MCNC: 1.3 NG/ML (ref 0–4)
T4 FREE SERPL-MCNC: 1.1 NG/DL (ref 0.7–1.5)
TSH SERPL DL<=0.05 MIU/L-ACNC: 0.84 UIU/ML (ref 0.36–3.74)

## 2019-08-07 LAB
HIV 1+2 AB+HIV1 P24 AG SERPL QL IA: NONREACTIVE
HIV12 RESULT COMMENT, HHIVC: NORMAL

## 2019-08-08 NOTE — PROGRESS NOTES
Within acceptable limits. Please notify patient his lab was negative. He needs to complete the ultrasounds at [facility] and I referred him to endocrine at his last visit. Has he scheduled or heard about these?

## 2019-08-08 NOTE — PROGRESS NOTES
Attempted to contact pt at  number, no answer. m for pt to return call to office at [phone] . Will continue to try to contact pt.

## 2019-08-14 NOTE — PROGRESS NOTES
Attempted to contact patient at  number, no answer. Lvm for patient to return call to office at [phone]. I have been unable to reach this patient by phone. A letter is being sent to the last known home address.

## 2019-09-10 RX ORDER — OMEPRAZOLE 40 MG/1
40 CAPSULE, DELAYED RELEASE ORAL DAILY
Qty: 90 CAP | Refills: 0 | Status: SHIPPED | OUTPATIENT
Start: 2019-09-10 | End: 2019-11-14 | Stop reason: SDUPTHER

## 2019-09-10 RX ORDER — HYDRALAZINE HYDROCHLORIDE 25 MG/1
25 TABLET, FILM COATED ORAL 3 TIMES DAILY
Qty: 270 TAB | Refills: 0 | Status: SHIPPED | OUTPATIENT
Start: 2019-09-10 | End: 2019-11-14 | Stop reason: SDUPTHER

## 2019-09-10 RX ORDER — MECLIZINE HYDROCHLORIDE 25 MG/1
25 TABLET ORAL
Qty: 90 TAB | Refills: 0 | Status: SHIPPED | OUTPATIENT
Start: 2019-09-10 | End: 2019-09-20 | Stop reason: SDUPTHER

## 2019-09-10 RX ORDER — CALCITRIOL 0.25 UG/1
0.25 CAPSULE ORAL DAILY
Qty: 90 CAP | Refills: 0 | OUTPATIENT
Start: 2019-09-10

## 2019-09-10 RX ORDER — CALCIUM ACETATE 667 MG/1
1 CAPSULE ORAL
OUTPATIENT
Start: 2019-09-10

## 2019-09-10 RX ORDER — METOPROLOL TARTRATE 25 MG/1
25 TABLET, FILM COATED ORAL 2 TIMES DAILY
Qty: 180 TAB | Refills: 0 | Status: SHIPPED | OUTPATIENT
Start: 2019-09-10 | End: 2019-11-14 | Stop reason: SDUPTHER

## 2019-09-11 NOTE — TELEPHONE ENCOUNTER
Who prescribed the phoslo and calitriol? Renal?    Sent electronically other medications. Requested Prescriptions     Signed Prescriptions Disp Refills    meclizine (ANTIVERT) 25 mg tablet 90 Tab 0     Sig: Take 1 Tab by mouth three (3) times daily as needed for Dizziness. Authorizing Provider: David Cason hydrALAZINE (APRESOLINE) 25 mg tablet 270 Tab 0     Sig: Take 1 Tab by mouth three (3) times daily. Authorizing Provider: Will CORTEZ    metoprolol tartrate (LOPRESSOR) 25 mg tablet 180 Tab 0     Sig: Take 1 Tab by mouth two (2) times a day. Authorizing Provider: Will CORTEZ    omeprazole (PRILOSEC) 40 mg capsule 90 Cap 0     Sig: Take 1 Cap by mouth daily. Authorizing Provider: Capo Haq     Refused Prescriptions Disp Refills    calcium acetate (PHOSLO) 667 mg cap       Sig: Take 1 Cap by mouth three (3) times daily (with meals). Refused By: Capo Haq     Reason for Refusal: other    calcitRIOL (ROCALTROL) 0.25 mcg capsule 90 Cap 0     Sig: Take 1 Cap by mouth daily.      Refused By: Capo Haq     Reason for Refusal: other

## 2019-09-16 NOTE — TELEPHONE ENCOUNTER
Attempted to contact patient at  number, spoke with patient mother who reports patient is not available. Left message for patient to return call to office at [phone]. Will continue to try to contact patient.

## 2019-09-19 NOTE — TELEPHONE ENCOUNTER
Attempted to contact patient at  number, no answer. Lvm for patient to return call to office at [phone]. Will continue to try to contact patient.

## 2019-09-20 RX ORDER — MECLIZINE HYDROCHLORIDE 25 MG/1
TABLET ORAL
Qty: 90 TAB | Refills: 0 | Status: SHIPPED | OUTPATIENT
Start: 2019-09-20 | End: 2019-10-15 | Stop reason: SDUPTHER

## 2019-10-15 RX ORDER — MECLIZINE HYDROCHLORIDE 25 MG/1
TABLET ORAL
Qty: 90 TAB | Refills: 0 | Status: SHIPPED | OUTPATIENT
Start: 2019-10-15 | End: 2019-11-14 | Stop reason: SDUPTHER

## 2019-11-14 RX ORDER — OMEPRAZOLE 40 MG/1
CAPSULE, DELAYED RELEASE ORAL
Qty: 90 CAP | Refills: 0 | Status: SHIPPED | OUTPATIENT
Start: 2019-11-14 | End: 2020-04-09 | Stop reason: SDUPTHER

## 2019-11-14 RX ORDER — HYDRALAZINE HYDROCHLORIDE 25 MG/1
TABLET, FILM COATED ORAL
Qty: 270 TAB | Refills: 0 | Status: SHIPPED | OUTPATIENT
Start: 2019-11-14 | End: 2020-04-09 | Stop reason: SDUPTHER

## 2019-11-14 RX ORDER — METOPROLOL TARTRATE 25 MG/1
TABLET, FILM COATED ORAL
Qty: 180 TAB | Refills: 0 | Status: SHIPPED | OUTPATIENT
Start: 2019-11-14 | End: 2020-04-09 | Stop reason: SDUPTHER

## 2019-11-15 NOTE — TELEPHONE ENCOUNTER
Attempted to reach pt to advise a courtesy refill was done, he will need appt for future refills, no answer, left vm for return call

## 2020-04-10 RX ORDER — TRAZODONE HYDROCHLORIDE 50 MG/1
50 TABLET ORAL
Qty: 90 TAB | Refills: 0 | Status: SHIPPED | OUTPATIENT
Start: 2020-04-10

## 2020-04-10 RX ORDER — HYDRALAZINE HYDROCHLORIDE 25 MG/1
TABLET, FILM COATED ORAL
Qty: 270 TAB | Refills: 0 | Status: SHIPPED | OUTPATIENT
Start: 2020-04-10 | End: 2021-05-25

## 2020-04-10 RX ORDER — MECLIZINE HYDROCHLORIDE 25 MG/1
TABLET ORAL
Qty: 90 TAB | Refills: 0 | Status: SHIPPED | OUTPATIENT
Start: 2020-04-10 | End: 2021-11-11

## 2020-04-10 RX ORDER — METOPROLOL TARTRATE 25 MG/1
TABLET, FILM COATED ORAL
Qty: 180 TAB | Refills: 0 | Status: SHIPPED | OUTPATIENT
Start: 2020-04-10 | End: 2022-08-16 | Stop reason: SDUPTHER

## 2020-04-10 RX ORDER — OMEPRAZOLE 40 MG/1
CAPSULE, DELAYED RELEASE ORAL
Qty: 90 CAP | Refills: 0 | Status: SHIPPED | OUTPATIENT
Start: 2020-04-10

## 2021-05-25 ENCOUNTER — OFFICE VISIT (OUTPATIENT)
Dept: INTERNAL MEDICINE CLINIC | Age: 51
End: 2021-05-25
Payer: MEDICARE

## 2021-05-25 VITALS
HEIGHT: 66 IN | BODY MASS INDEX: 21.92 KG/M2 | DIASTOLIC BLOOD PRESSURE: 110 MMHG | SYSTOLIC BLOOD PRESSURE: 190 MMHG | RESPIRATION RATE: 18 BRPM | WEIGHT: 136.4 LBS | HEART RATE: 84 BPM | TEMPERATURE: 97.9 F | OXYGEN SATURATION: 97 %

## 2021-05-25 DIAGNOSIS — N18.6 ESRD (END STAGE RENAL DISEASE) (HCC): ICD-10-CM

## 2021-05-25 DIAGNOSIS — M51.26 LUMBAR HERNIATED DISC: Primary | ICD-10-CM

## 2021-05-25 DIAGNOSIS — I10 HYPERTENSION, UNSPECIFIED TYPE: ICD-10-CM

## 2021-05-25 PROCEDURE — G9231 DOC ESRD DIA TRANS PREG: HCPCS | Performed by: INTERNAL MEDICINE

## 2021-05-25 PROCEDURE — G8510 SCR DEP NEG, NO PLAN REQD: HCPCS | Performed by: INTERNAL MEDICINE

## 2021-05-25 PROCEDURE — 3017F COLORECTAL CA SCREEN DOC REV: CPT | Performed by: INTERNAL MEDICINE

## 2021-05-25 PROCEDURE — G8420 CALC BMI NORM PARAMETERS: HCPCS | Performed by: INTERNAL MEDICINE

## 2021-05-25 PROCEDURE — G8427 DOCREV CUR MEDS BY ELIG CLIN: HCPCS | Performed by: INTERNAL MEDICINE

## 2021-05-25 PROCEDURE — 99203 OFFICE O/P NEW LOW 30 MIN: CPT | Performed by: INTERNAL MEDICINE

## 2021-05-25 RX ORDER — PREDNISONE 20 MG/1
40 TABLET ORAL
Qty: 20 TABLET | Refills: 0 | Status: SHIPPED | OUTPATIENT
Start: 2021-05-25 | End: 2021-11-11

## 2021-05-25 RX ORDER — HYDRALAZINE HYDROCHLORIDE 25 MG/1
50 TABLET, FILM COATED ORAL 3 TIMES DAILY
Qty: 270 TABLET | Refills: 4 | Status: SHIPPED | OUTPATIENT
Start: 2021-05-25 | End: 2021-11-11

## 2021-05-25 NOTE — PROGRESS NOTES
Dread Quinones is a 44 y.o. male (: 1970) presenting to address:    Chief Complaint   Patient presents with    Shortness of Breath    Leg Pain     LT leg                pain scale 6/10    Numbness     patient c/o numbness LT side x 8 days        Vitals:    21 1327   BP: (!) 190/110   Pulse: 84   Resp: 18   Temp: 97.9 °F (36.6 °C)   TempSrc: Oral   SpO2: 97%   Weight: 136 lb 6.4 oz (61.9 kg)   Height: 5' 6\" (1.676 m)   PainSc:   6   PainLoc: Leg       Hearing/Vision:   No exam data present    Learning Assessment:     Learning Assessment 2019   PRIMARY LEARNER Patient   HIGHEST LEVEL OF EDUCATION - PRIMARY LEARNER  DID NOT GRADUATE HIGH SCHOOL   BARRIERS PRIMARY LEARNER NONE   CO-LEARNER CAREGIVER No   PRIMARY LANGUAGE ENGLISH   LEARNER PREFERENCE PRIMARY LISTENING   ANSWERED BY patient   RELATIONSHIP SELF     Depression Screening:     3 most recent PHQ Screens 2021   Little interest or pleasure in doing things Not at all   Feeling down, depressed, irritable, or hopeless Not at all   Total Score PHQ 2 0     Fall Risk Assessment:   No flowsheet data found. Abuse Screening:   No flowsheet data found. Coordination of Care Questionaire:   1. Have you been to the ER, urgent care clinic since your last visit? Hospitalized since your last visit? NO    2. Have you seen or consulted any other health care providers outside of the [address] [place] [address] since your last visit? Include any pap smears or colon screening. NO    Advanced Directive:   1. Do you have an Advanced Directive? NO    2. Would you like information on Advanced Directives?  NO

## 2021-05-25 NOTE — PROGRESS NOTES
Progress Note    Patient: Shahana Dhaliwal               Sex: male                  YOB: 1970      Age:  44 y.o.                    HPI:     Shahana Dhaliwal is a 44 y.o. male who has been seen for L leg pain   8 days ago . He had some balance issues before this happened . He  has issues with numbness in his  L leg     Past Medical History:   Diagnosis Date    Asthma     Chronic kidney disease        History reviewed. No pertinent surgical history. Family History   Problem Relation Age of Onset    Seizures Mother     Asthma Mother     Cancer Maternal Grandfather         throat       Social History     Socioeconomic History    Marital status:      Spouse name: Not on file    Number of children: Not on file    Years of education: Not on file    Highest education level: Not on file   Tobacco Use    Smoking status: Current Some Day Smoker    Smokeless tobacco: Never Used   Substance and Sexual Activity    Alcohol use: No    Drug use: No    Sexual activity: Never     Social Determinants of Health     Financial Resource Strain:     Difficulty of Paying Living Expenses:    Food Insecurity:     Worried About Running Out of Food in the Last Year:     [address] in the Last Year:    Transportation Needs:     Lack of Transportation (Medical):      Lack of Transportation (Non-Medical):    Physical Activity:     Days of Exercise per Week:     Minutes of Exercise per Session:    Stress:     Feeling of Stress :    Social Connections:     Frequency of Communication with Friends and Family:     Frequency of Social Gatherings with Friends and Family:     Attends Tenriism Services:     Active Member of Clubs or Organizations:     Attends Club or Organization Meetings:     Marital Status:          Current Outpatient Medications:     metoprolol tartrate (LOPRESSOR) 25 mg tablet, TAKE 1 TABLET BY MOUTH 2 TIMES A DAY, Disp: 180 Tab, Rfl: 0    hydrALAZINE (APRESOLINE) 25 mg tablet, TAKE 1 TABLET BY MOUTH 3 TIMES A DAY, Disp: 270 Tab, Rfl: 0    omeprazole (PRILOSEC) 40 mg capsule, TAKE 1 CAPSULE BY MOUTH ONCE DAILY, Disp: 90 Cap, Rfl: 0    calcium acetate (PHOSLO) 667 mg cap, Take 1 Cap by mouth three (3) times daily (with meals). , Disp: , Rfl:     meclizine (ANTIVERT) 25 mg tablet, TAKE 1 TABLET BY MOUTH 3 TIMES A DAY AS NEEDED FOR DIZZINESS (Patient not taking: Reported on 5/25/2021), Disp: 90 Tab, Rfl: 0    traZODone (DESYREL) 50 mg tablet, Take 1 Tab by mouth nightly. (Patient not taking: Reported on 5/25/2021), Disp: 90 Tab, Rfl: 0    nicotine (NICODERM CQ) 21 mg/24 hr, 1 Patch by TransDERmal route every twenty-four (24) hours. (Patient not taking: Reported on 5/25/2021), Disp: 60 Patch, Rfl: 0    lidocaine (LIDODERM) 5 %, Apply patch to the affected area for 12 hours a day and remove for 12 hours a day. (Patient not taking: Reported on 5/25/2021), Disp: 30 Each, Rfl: 5    calcitRIOL (ROCALTROL) 0.25 mcg capsule, Take 0.25 mcg by mouth daily. (Patient not taking: Reported on 5/25/2021), Disp: , Rfl:      No Known Allergies    Review of Systems   Constitutional: Negative for chills and fever. Eyes: Positive for blurred vision. Respiratory: Positive for cough and shortness of breath. Cardiovascular: Negative for chest pain. Gastrointestinal: Negative. Genitourinary: Positive for frequency. On dialysis   Neurological: Negative for dizziness and loss of consciousness. Physical Exam:      Visit Vitals  BP (!) 190/110 (BP 1 Location: Left upper arm, BP Patient Position: Sitting, BP Cuff Size: Adult)   Pulse 84   Temp 97.9 °F (36.6 °C) (Oral)   Resp 18   Ht 5' 6\" (1.676 m)   Wt 136 lb 6.4 oz (61.9 kg)   SpO2 97%   BMI 22.02 kg/m²       Physical Exam  Constitutional:       Appearance: Normal appearance. HENT:      Mouth/Throat:      Mouth: Mucous membranes are dry. Cardiovascular:      Rate and Rhythm: Normal rate and regular rhythm.       Heart sounds: No murmur heard.   No friction rub. No gallop. Pulmonary:      Effort: Pulmonary effort is normal. No respiratory distress. Breath sounds: Normal breath sounds. No stridor. No wheezing or rhonchi. Neurological:      Mental Status: He is alert and oriented to person, place, and time. Gait: Gait abnormal.      Deep Tendon Reflexes: Reflexes abnormal.      Comments: Diminished  DTR L patellar   Weakness L hip flexors          Labs Reviewed:      Assessment/Plan       ICD-10-CM ICD-9-CM    1. Lumbar herniated disc  M51.26 722.10 MRI LUMB SPINE W WO CONT      predniSONE (DELTASONE) 20 mg tablet   2. ESRD (end stage renal disease) ([facility])  N18.6 585.6 hydrALAZINE (APRESOLINE) 25 mg tablet   3.  Hypertension, unspecified type  I10 401.9 hydrALAZINE (APRESOLINE) 25 mg tablet   increase hyralazine to 50 mg tid          Elizabeth Costa MD

## 2021-05-26 ENCOUNTER — TELEPHONE (OUTPATIENT)
Dept: INTERNAL MEDICINE CLINIC | Age: 51
End: 2021-05-26

## 2021-05-26 NOTE — TELEPHONE ENCOUNTER
Pharmacy called to clarify instruction on hydralazine      Take 2 Tablets by mouth three (3) times daily.  TAKE 1 TABLET BY MOUTH 3 TIMES A DAY

## 2021-06-01 ENCOUNTER — OFFICE VISIT (OUTPATIENT)
Dept: INTERNAL MEDICINE CLINIC | Age: 51
End: 2021-06-01
Payer: MEDICARE

## 2021-06-01 VITALS
OXYGEN SATURATION: 94 % | RESPIRATION RATE: 18 BRPM | BODY MASS INDEX: 23.33 KG/M2 | TEMPERATURE: 98.4 F | DIASTOLIC BLOOD PRESSURE: 130 MMHG | SYSTOLIC BLOOD PRESSURE: 195 MMHG | WEIGHT: 145.2 LBS | HEART RATE: 84 BPM | HEIGHT: 66 IN

## 2021-06-01 DIAGNOSIS — Z91.15 NON-COMPLIANCE WITH RENAL DIALYSIS (HCC): ICD-10-CM

## 2021-06-01 DIAGNOSIS — M51.26 HNP (HERNIATED NUCLEUS PULPOSUS), LUMBAR: Primary | ICD-10-CM

## 2021-06-01 DIAGNOSIS — I10 ESSENTIAL HYPERTENSION: ICD-10-CM

## 2021-06-01 DIAGNOSIS — M54.2 NECK PAIN: ICD-10-CM

## 2021-06-01 PROCEDURE — G9231 DOC ESRD DIA TRANS PREG: HCPCS | Performed by: INTERNAL MEDICINE

## 2021-06-01 PROCEDURE — 99213 OFFICE O/P EST LOW 20 MIN: CPT | Performed by: INTERNAL MEDICINE

## 2021-06-01 PROCEDURE — G8510 SCR DEP NEG, NO PLAN REQD: HCPCS | Performed by: INTERNAL MEDICINE

## 2021-06-01 PROCEDURE — G8427 DOCREV CUR MEDS BY ELIG CLIN: HCPCS | Performed by: INTERNAL MEDICINE

## 2021-06-01 PROCEDURE — 3017F COLORECTAL CA SCREEN DOC REV: CPT | Performed by: INTERNAL MEDICINE

## 2021-06-01 PROCEDURE — G8420 CALC BMI NORM PARAMETERS: HCPCS | Performed by: INTERNAL MEDICINE

## 2021-06-01 RX ORDER — DICLOFENAC SODIUM 10 MG/G
2 GEL TOPICAL 4 TIMES DAILY
Qty: 50 G | Refills: 3 | Status: SHIPPED | OUTPATIENT
Start: 2021-06-01 | End: 2021-11-11

## 2021-06-01 NOTE — PROGRESS NOTES
ROOM # 12    Leonor Chaudhari presents today for   Chief Complaint   Patient presents with    Numbness     Left-sided numbness x 2 weeks. Woke up with left-sided facial dropping, slurred speech, and leg heaviness but declined to go to ER for evaluation    ED Follow-up     [facility] on 5/26/21 from dialysis due to venous infiltration, HTN       Leonor Chaudhari preferred language for health care discussion is english/other. Is someone accompanying this pt? NO    Is the patient using any DME equipment during OV? NO    Depression Screening:  3 most recent [facility] Screens 6/1/2021 5/25/2021 7/12/2019 4/26/2019 2/5/2019 12/4/2018   Little interest or pleasure in doing things Not at all Not at all Not at all Not at all Not at all Several days   Feeling down, depressed, irritable, or hopeless Not at all Not at all Not at all Not at all Not at all Several days   Total Score PHQ 2 0 0 0 0 0 2       Learning Assessment:  Learning Assessment 4/26/2019 12/4/2018   PRIMARY LEARNER Patient Patient   HIGHEST LEVEL OF EDUCATION - PRIMARY LEARNER  DID NOT GRADUATE HIGH SCHOOL DID NOT GRADUATE [address] CAREGIVER No No   PRIMARY LANGUAGE ENGLISH ENGLISH   LEARNER PREFERENCE PRIMARY LISTENING DEMONSTRATION   ANSWERED BY patient patient   RELATIONSHIP SELF SELF       Abuse Screening:  No flowsheet data found. Fall Risk  No flowsheet data found. Health Maintenance reviewed and discussed per provider. Yes    Leonor Chaudhari is due for   Health Maintenance Due   Topic Date Due    Pneumococcal 0-64 years (1 of 4 - PCV13) Never done    COVID-19 Vaccine (1) Never done    DTaP/Tdap/Td series (1 - Tdap) Never done    Shingrix Vaccine Age 50> (1 of 2) Never done    Colorectal Cancer Screening Combo  Never done    Medicare Yearly Exam  Never done         Please order/place referral if appropriate. Advance Directive:  1. Do you have an advance directive in place? Patient Reply: NO    2.  If not, would you like material regarding how to put one in place? Patient Reply: NO    Coordination of Care:  1. Have you been to the ER, urgent care clinic since your last visit? Hospitalized since your last visit? YES-[facility] on 5/25/21 for HTN during dialysis    2. Have you seen or consulted any other health care providers outside of the [address] [place] [address] since your last visit? Include any pap smears or colon screening.  Kaylan Marshall

## 2021-06-01 NOTE — PROGRESS NOTES
Progress Note    Patient: Joey Guzman               Sex: male                  YOB: 1970      Age:  44 y.o.                    HPI:     Joey Guzman is a 44 y.o. male who has been seen for   leg pain. He was supposed to have an MRI for possible HNP but did not get phone calls . Some problem with his phone . He now describes pain in his L neck . He was asking  For narcotics     Past Medical History:   Diagnosis Date    Asthma     Chronic kidney disease        History reviewed. No pertinent surgical history. Family History   Problem Relation Age of Onset    Seizures Mother     Asthma Mother     Cancer Maternal Grandfather         throat       Social History     Socioeconomic History    Marital status:      Spouse name: Not on file    Number of children: Not on file    Years of education: Not on file    Highest education level: Not on file   Tobacco Use    Smoking status: Former Smoker     Packs/day: 2.00     Years: 20.00     Pack years: 40.00     Types: Cigarettes     Quit date: 2021     Years since quittin.0    Smokeless tobacco: Never Used    Tobacco comment: continue not to smoke   Vaping Use    Vaping Use: Never used   Substance and Sexual Activity    Alcohol use: No    Drug use: No    Sexual activity: Never     Social Determinants of Health     Financial Resource Strain:     Difficulty of Paying Living Expenses:    Food Insecurity:     Worried About Running Out of Food in the Last Year:     [address] in the Last Year:    Transportation Needs:     Lack of Transportation (Medical):      Lack of Transportation (Non-Medical):    Physical Activity:     Days of Exercise per Week:     Minutes of Exercise per Session:    Stress:     Feeling of Stress :    Social Connections:     Frequency of Communication with Friends and Family:     Frequency of Social Gatherings with Friends and Family:     Attends Mu-ism Services:     Active Member of Clubs or Organizations:     Attends Club or Organization Meetings:     Marital Status:          Current Outpatient Medications:     predniSONE (DELTASONE) 20 mg tablet, Take 40 mg by mouth daily (with breakfast). , Disp: 20 Tablet, Rfl: 0    hydrALAZINE (APRESOLINE) 25 mg tablet, Take 2 Tablets by mouth three (3) times daily. TAKE 1 TABLET BY MOUTH 3 TIMES A DAY, Disp: 270 Tablet, Rfl: 4    metoprolol tartrate (LOPRESSOR) 25 mg tablet, TAKE 1 TABLET BY MOUTH 2 TIMES A DAY, Disp: 180 Tab, Rfl: 0    omeprazole (PRILOSEC) 40 mg capsule, TAKE 1 CAPSULE BY MOUTH ONCE DAILY, Disp: 90 Cap, Rfl: 0    calcium acetate (PHOSLO) 667 mg cap, Take 1 Cap by mouth three (3) times daily (with meals). , Disp: , Rfl:     meclizine (ANTIVERT) 25 mg tablet, TAKE 1 TABLET BY MOUTH 3 TIMES A DAY AS NEEDED FOR DIZZINESS (Patient not taking: Reported on 5/25/2021), Disp: 90 Tab, Rfl: 0    traZODone (DESYREL) 50 mg tablet, Take 1 Tab by mouth nightly. (Patient not taking: Reported on 5/25/2021), Disp: 90 Tab, Rfl: 0    nicotine (NICODERM CQ) 21 mg/24 hr, 1 Patch by TransDERmal route every twenty-four (24) hours. (Patient not taking: Reported on 5/25/2021), Disp: 60 Patch, Rfl: 0    lidocaine (LIDODERM) 5 %, Apply patch to the affected area for 12 hours a day and remove for 12 hours a day. (Patient not taking: Reported on 5/25/2021), Disp: 30 Each, Rfl: 5    calcitRIOL (ROCALTROL) 0.25 mcg capsule, Take 0.25 mcg by mouth daily. (Patient not taking: Reported on 5/25/2021), Disp: , Rfl:      No Known Allergies    Review of Systems   Constitutional: Negative for chills and fever. Eyes: Negative for blurred vision. Respiratory: Negative for shortness of breath. Cardiovascular: Negative for chest pain. Gastrointestinal: Negative. Musculoskeletal: Positive for neck pain. Neurological: Negative for dizziness.         Physical Exam:      Visit Vitals  BP (!) 201/137 (BP 1 Location: Left arm, BP Patient Position: Sitting)   Pulse 84 Temp 98.4 °F (36.9 °C) (Oral)   Resp 18   Ht 5' 6\" (1.676 m)   Wt 145 lb 3.2 oz (65.9 kg)   SpO2 94%   BMI 23.44 kg/m²       Physical Exam  Constitutional:       Appearance: Normal appearance. Neck:      Comments: Tender L trapezius  Cardiovascular:      Rate and Rhythm: Normal rate and regular rhythm. Pulmonary:      Effort: Pulmonary effort is normal.      Breath sounds: Normal breath sounds. Musculoskeletal:         General: No swelling. Cervical back: Normal range of motion. Tenderness present. Skin:     General: Skin is warm and dry. Neurological:      Mental Status: He is alert. Labs Reviewed:      Assessment/Plan       ICD-10-CM ICD-9-CM    1. HNP (herniated nucleus pulposus), lumbar  M51.26 722.10    2. Neck pain  M54.2 723.1 diclofenac (VOLTAREN) 1 % gel   3. Essential hypertension  I10 401.9    4. Non-compliance with renal dialysis ([facility]  Z91.15 V45.12    he has not taken his meds today .  Advised go home and take meds as prescribed           Nikki Jackson MD

## 2021-07-01 ENCOUNTER — OFFICE VISIT (OUTPATIENT)
Dept: INTERNAL MEDICINE CLINIC | Age: 51
End: 2021-07-01
Payer: MEDICARE

## 2021-07-01 VITALS
DIASTOLIC BLOOD PRESSURE: 126 MMHG | HEIGHT: 66 IN | OXYGEN SATURATION: 95 % | RESPIRATION RATE: 20 BRPM | HEART RATE: 72 BPM | TEMPERATURE: 98.1 F | BODY MASS INDEX: 22.02 KG/M2 | WEIGHT: 137 LBS | SYSTOLIC BLOOD PRESSURE: 196 MMHG

## 2021-07-01 DIAGNOSIS — I69.359 CVA, OLD, HEMIPARESIS (HCC): ICD-10-CM

## 2021-07-01 DIAGNOSIS — I10 ESSENTIAL HYPERTENSION: Primary | ICD-10-CM

## 2021-07-01 DIAGNOSIS — Z91.15 NON-COMPLIANCE WITH RENAL DIALYSIS (HCC): ICD-10-CM

## 2021-07-01 DIAGNOSIS — J43.8 OTHER EMPHYSEMA (HCC): ICD-10-CM

## 2021-07-01 PROCEDURE — 3017F COLORECTAL CA SCREEN DOC REV: CPT | Performed by: INTERNAL MEDICINE

## 2021-07-01 PROCEDURE — 99214 OFFICE O/P EST MOD 30 MIN: CPT | Performed by: INTERNAL MEDICINE

## 2021-07-01 PROCEDURE — G8427 DOCREV CUR MEDS BY ELIG CLIN: HCPCS | Performed by: INTERNAL MEDICINE

## 2021-07-01 PROCEDURE — G8510 SCR DEP NEG, NO PLAN REQD: HCPCS | Performed by: INTERNAL MEDICINE

## 2021-07-01 PROCEDURE — G8420 CALC BMI NORM PARAMETERS: HCPCS | Performed by: INTERNAL MEDICINE

## 2021-07-01 PROCEDURE — G9231 DOC ESRD DIA TRANS PREG: HCPCS | Performed by: INTERNAL MEDICINE

## 2021-07-01 RX ORDER — ALBUTEROL SULFATE 0.83 MG/ML
1.25 SOLUTION RESPIRATORY (INHALATION)
Qty: 30 NEBULE | Refills: 4 | Status: SHIPPED | OUTPATIENT
Start: 2021-07-01 | End: 2021-11-11 | Stop reason: SDUPTHER

## 2021-07-01 RX ORDER — CLONIDINE HYDROCHLORIDE 0.2 MG/1
0.2 TABLET ORAL 2 TIMES DAILY
Qty: 60 TABLET | Refills: 1 | Status: SHIPPED | OUTPATIENT
Start: 2021-07-01 | End: 2021-11-11

## 2021-07-01 NOTE — PROGRESS NOTES
Progress Note    Patient: Sabino Puentes               Sex: male                  YOB: 1970      Age:  44 y.o.                    HPI:     Sabino Puentes is a 44 y.o. male who has been seen for  followup of his  Uncontrolled hypertension . He also has some memory issues. He missed his dialysis Rx again today . Past Medical History:   Diagnosis Date    Asthma     Chronic kidney disease        History reviewed. No pertinent surgical history. Family History   Problem Relation Age of Onset    Seizures Mother     Asthma Mother     Cancer Maternal Grandfather         throat       Social History     Socioeconomic History    Marital status:      Spouse name: Not on file    Number of children: Not on file    Years of education: Not on file    Highest education level: Not on file   Tobacco Use    Smoking status: Former Smoker     Packs/day: 2.00     Years: 20.00     Pack years: 40.00     Types: Cigarettes     Quit date: 2021     Years since quittin.1    Smokeless tobacco: Never Used    Tobacco comment: continue not to smoke   Vaping Use    Vaping Use: Never used   Substance and Sexual Activity    Alcohol use: No    Drug use: No    Sexual activity: Never     Social Determinants of Health     Financial Resource Strain:     Difficulty of Paying Living Expenses:    Food Insecurity:     Worried About Running Out of Food in the Last Year:     Ran Out of Food in the Last Year:    Transportation Needs:     Lack of Transportation (Medical):      Lack of Transportation (Non-Medical):    Physical Activity:     Days of Exercise per Week:     Minutes of Exercise per Session:    Stress:     Feeling of Stress :    Social Connections:     Frequency of Communication with Friends and Family:     Frequency of Social Gatherings with Friends and Family:     Attends Orthodox Services:     Active Member of Clubs or Organizations:     Attends Club or Organization Meetings:     Marital Status:          Current Outpatient Medications:     hydrALAZINE (APRESOLINE) 25 mg tablet, Take 2 Tablets by mouth three (3) times daily. TAKE 1 TABLET BY MOUTH 3 TIMES A DAY, Disp: 270 Tablet, Rfl: 4    metoprolol tartrate (LOPRESSOR) 25 mg tablet, TAKE 1 TABLET BY MOUTH 2 TIMES A DAY, Disp: 180 Tab, Rfl: 0    omeprazole (PRILOSEC) 40 mg capsule, TAKE 1 CAPSULE BY MOUTH ONCE DAILY, Disp: 90 Cap, Rfl: 0    calcium acetate (PHOSLO) 667 mg cap, Take 1 Cap by mouth three (3) times daily (with meals). , Disp: , Rfl:     diclofenac (VOLTAREN) 1 % gel, Apply 2 g to affected area four (4) times daily. , Disp: 50 g, Rfl: 3    predniSONE (DELTASONE) 20 mg tablet, Take 40 mg by mouth daily (with breakfast). (Patient not taking: Reported on 7/1/2021), Disp: 20 Tablet, Rfl: 0    meclizine (ANTIVERT) 25 mg tablet, TAKE 1 TABLET BY MOUTH 3 TIMES A DAY AS NEEDED FOR DIZZINESS (Patient not taking: Reported on 5/25/2021), Disp: 90 Tab, Rfl: 0    traZODone (DESYREL) 50 mg tablet, Take 1 Tab by mouth nightly. (Patient not taking: Reported on 5/25/2021), Disp: 90 Tab, Rfl: 0    nicotine (NICODERM CQ) 21 mg/24 hr, 1 Patch by TransDERmal route every twenty-four (24) hours. (Patient not taking: Reported on 5/25/2021), Disp: 60 Patch, Rfl: 0    lidocaine (LIDODERM) 5 %, Apply patch to the affected area for 12 hours a day and remove for 12 hours a day. (Patient not taking: Reported on 5/25/2021), Disp: 30 Each, Rfl: 5    calcitRIOL (ROCALTROL) 0.25 mcg capsule, Take 0.25 mcg by mouth daily. (Patient not taking: Reported on 5/25/2021), Disp: , Rfl:      No Known Allergies    Review of Systems   Constitutional: Negative for chills and fever. Respiratory: Negative for cough. Cardiovascular: Negative for chest pain.         Physical Exam:      Visit Vitals  BP (!) 196/126 (BP 1 Location: Right upper arm, BP Patient Position: Sitting, BP Cuff Size: Adult)   Pulse 72   Temp 98.1 °F (36.7 °C) (Temporal)   Resp 20   Ht 5' 6\" (1.676 m) Wt 137 lb (62.1 kg)   SpO2 95%   BMI 22.11 kg/m²       Physical Exam  Cardiovascular:      Rate and Rhythm: Normal rate and regular rhythm. Pulmonary:      Effort: Pulmonary effort is normal.      Breath sounds: Normal breath sounds. Skin:     General: Skin is warm and dry. Labs Reviewed:      Assessment/Plan       ICD-10-CM ICD-9-CM    1. Essential hypertension  I10 401.9    2. Non-compliance with [facility] ([facility]  Z91.15 V45.12    3.  CVA, old, hemiparesis ([facility]  I69.359 438.20 AMB SUPPLY ORDER   stressed need for dialysis as scheduled ,  Added catapres for BP , will recheck in 1 week , advised bring in all meds  Ordered a rollator          Анна Veloz MD

## 2021-07-01 NOTE — PROGRESS NOTES
Zbigniew Bartlett is a 44 y.o. male (: 1970) presenting to address:    Chief Complaint   Patient presents with    Memory Loss       Vitals:    21 1543   BP: (!) 196/126   Pulse: 72   Resp: 20   Temp: 98.1 °F (36.7 °C)   TempSrc: Temporal   SpO2: 95%   Weight: 137 lb (62.1 kg)   Height: 5' 6\" (1.676 m)   PainSc:   0 - No pain       Hearing/Vision:   No exam data present    Learning Assessment:     Learning Assessment 2019   PRIMARY LEARNER Patient   HIGHEST LEVEL OF EDUCATION - PRIMARY LEARNER  DID NOT GRADUATE HIGH SCHOOL   BARRIERS PRIMARY LEARNER NONE   CO-LEARNER CAREGIVER No   PRIMARY LANGUAGE ENGLISH   LEARNER PREFERENCE PRIMARY LISTENING   ANSWERED BY patient   RELATIONSHIP SELF     Depression Screening:     3 most recent PHQ Screens 2021   Little interest or pleasure in doing things Not at all   Feeling down, depressed, irritable, or hopeless Not at all   Total Score PHQ 2 0     Fall Risk Assessment:   No flowsheet data found. Abuse Screening:   No flowsheet data found. Coordination of Care Questionaire:   1. Have you been to the ER, urgent care clinic since your last visit? Hospitalized since your last visit? NO    2. Have you seen or consulted any other health care providers outside of the [address] [place] [address] since your last visit? Include any pap smears or colon screening. NO    Advanced Directive:   1. Do you have an Advanced Directive? NO    2. Would you like information on Advanced Directives?  NO

## 2021-07-02 ENCOUNTER — DOCUMENTATION ONLY (OUTPATIENT)
Dept: INTERNAL MEDICINE CLINIC | Age: 51
End: 2021-07-02

## 2021-07-14 ENCOUNTER — TELEPHONE (OUTPATIENT)
Dept: INTERNAL MEDICINE CLINIC | Age: 51
End: 2021-07-14

## 2021-07-14 NOTE — TELEPHONE ENCOUNTER
Order was faxed to [facility] for a rollator and nebulizer kit. Confirmation was received. I called today to follow up and was told that order was never received. I faxed again to [phone]  Confirmation received and I called to verify it was received.

## 2021-11-10 ENCOUNTER — HOSPITAL ENCOUNTER (OUTPATIENT)
Dept: LAB | Age: 51
Discharge: HOME | End: 2021-11-10

## 2021-11-10 LAB
HBV SURFACE AB SER QL IA: POSITIVE
HBV SURFACE AB SERPL IA-ACNC: >1000 MIU/ML
HBV SURFACE AG SER QL: <0.1 INDEX
HBV SURFACE AG SER QL: NEGATIVE
HCV AB SER IA-ACNC: <0.02 INDEX
HCV AB SERPL QL IA: NEGATIVE
HCV COMMENT,HCGAC: NORMAL
HEP BS AB COMMENT,HBSAC: NORMAL
HIV 1+2 AB+HIV1 P24 AG SERPL QL IA: NONREACTIVE
HIV1 P24 AG SERPL QL IA: NONREACTIVE
HIV1+2 AB SERPL QL IA: NONREACTIVE
HIV12 RESULT COMMENT, HHIVC: NORMAL

## 2021-11-10 PROCEDURE — 86803 HEPATITIS C AB TEST: CPT

## 2021-11-10 PROCEDURE — 87389 HIV-1 AG W/HIV-1&-2 AB AG IA: CPT

## 2021-11-10 PROCEDURE — 87340 HEPATITIS B SURFACE AG IA: CPT

## 2021-11-10 PROCEDURE — 86706 HEP B SURFACE ANTIBODY: CPT

## 2021-11-11 ENCOUNTER — OFFICE VISIT (OUTPATIENT)
Dept: INTERNAL MEDICINE CLINIC | Age: 51
End: 2021-11-11
Payer: MEDICARE

## 2021-11-11 VITALS
SYSTOLIC BLOOD PRESSURE: 190 MMHG | BODY MASS INDEX: 20.79 KG/M2 | RESPIRATION RATE: 16 BRPM | OXYGEN SATURATION: 90 % | HEART RATE: 87 BPM | WEIGHT: 129.4 LBS | TEMPERATURE: 97 F | HEIGHT: 66 IN | DIASTOLIC BLOOD PRESSURE: 117 MMHG

## 2021-11-11 DIAGNOSIS — J40 BRONCHITIS: ICD-10-CM

## 2021-11-11 DIAGNOSIS — I10 ESSENTIAL HYPERTENSION: Primary | ICD-10-CM

## 2021-11-11 DIAGNOSIS — F17.210 CIGARETTE SMOKER: ICD-10-CM

## 2021-11-11 DIAGNOSIS — N18.6 ESRD (END STAGE RENAL DISEASE) (HCC): ICD-10-CM

## 2021-11-11 PROCEDURE — 99213 OFFICE O/P EST LOW 20 MIN: CPT | Performed by: INTERNAL MEDICINE

## 2021-11-11 PROCEDURE — G9231 DOC ESRD DIA TRANS PREG: HCPCS | Performed by: INTERNAL MEDICINE

## 2021-11-11 PROCEDURE — G8427 DOCREV CUR MEDS BY ELIG CLIN: HCPCS | Performed by: INTERNAL MEDICINE

## 2021-11-11 PROCEDURE — G8510 SCR DEP NEG, NO PLAN REQD: HCPCS | Performed by: INTERNAL MEDICINE

## 2021-11-11 PROCEDURE — 3017F COLORECTAL CA SCREEN DOC REV: CPT | Performed by: INTERNAL MEDICINE

## 2021-11-11 PROCEDURE — G8420 CALC BMI NORM PARAMETERS: HCPCS | Performed by: INTERNAL MEDICINE

## 2021-11-11 RX ORDER — AZITHROMYCIN 250 MG/1
TABLET, FILM COATED ORAL
Qty: 6 TABLET | Refills: 0 | Status: SHIPPED | OUTPATIENT
Start: 2021-11-11 | End: 2021-11-16

## 2021-11-11 RX ORDER — IBUPROFEN 200 MG
1 TABLET ORAL EVERY 24 HOURS
Qty: 60 PATCH | Refills: 0 | Status: SHIPPED | OUTPATIENT
Start: 2021-11-11 | End: 2022-08-04

## 2021-11-11 RX ORDER — ALBUTEROL SULFATE 0.83 MG/ML
1.25 SOLUTION RESPIRATORY (INHALATION)
Qty: 30 NEBULE | Refills: 4 | Status: SHIPPED | OUTPATIENT
Start: 2021-11-11

## 2021-11-11 RX ORDER — CLONIDINE HYDROCHLORIDE 0.2 MG/1
TABLET ORAL
Qty: 180 TABLET | Refills: 3 | Status: SHIPPED | OUTPATIENT
Start: 2021-11-11

## 2021-11-11 RX ORDER — LIDOCAINE 50 MG/G
PATCH TOPICAL
Qty: 30 EACH | Refills: 5 | Status: SHIPPED | OUTPATIENT
Start: 2021-11-11

## 2021-11-11 NOTE — PROGRESS NOTES
Jackelyn Wright is a 46 y.o.  male presents today for office visit for acute care. Pt would also like to discuss cold. Pt is not fasting. Pt is in Room # 13      1. Have you been to the ER, urgent care clinic since your last visit? Hospitalized since your last visit? No    2. Have you seen or consulted any other health care providers outside of the [address] [place] [address] since your last visit? Include any pap smears or colon screening. No    Upcoming Appts  none    Health Maintenance reviewed     VORB: No orders of the defined types were placed in this encounter.   Abigail Nava LPN

## 2021-11-11 NOTE — PROGRESS NOTES
Progress Note    Patient: Marily Willingham               Sex: male                YOB: 1970      Age:  46 y.o.                    HPI:     Marily Willingham is a 46 y.o. male who has been seen for  a cold. He has a cough with yellow phlegm . He is wheezing in the am .  He smokes one cigarete  Per day     Past Medical History:   Diagnosis Date    Asthma     Chronic kidney disease        No past surgical history on file. Family History   Problem Relation Age of Onset    Seizures Mother     Asthma Mother     Cancer Maternal Grandfather         throat       Social History     Socioeconomic History    Marital status:    Tobacco Use    Smoking status: Former Smoker     Packs/day: 2.00     Years: 20.00     Pack years: 40.00     Types: Cigarettes     Quit date: 2021     Years since quittin.4    Smokeless tobacco: Never Used    Tobacco comment: continue not to smoke   Vaping Use    Vaping Use: Never used   Substance and Sexual Activity    Alcohol use: No    Drug use: No    Sexual activity: Never         Current Outpatient Medications:     cloNIDine HCL (CATAPRES) 0.2 mg tablet, Take 1 Tablet by mouth two (2) times a day., Disp: 60 Tablet, Rfl: 1    albuterol (PROVENTIL VENTOLIN) 2.5 mg /3 mL (0.083 %) nebu, 1.5 mL by Nebulization route every six (6) hours as needed for Wheezing. Indications: chronic obstructive pulmonary disease, Disp: 30 Nebule, Rfl: 4    metoprolol tartrate (LOPRESSOR) 25 mg tablet, TAKE 1 TABLET BY MOUTH 2 TIMES A DAY, Disp: 180 Tab, Rfl: 0    omeprazole (PRILOSEC) 40 mg capsule, TAKE 1 CAPSULE BY MOUTH ONCE DAILY, Disp: 90 Cap, Rfl: 0    nicotine (NICODERM CQ) 21 mg/24 hr, 1 Patch by TransDERmal route every twenty-four (24) hours. , Disp: 60 Patch, Rfl: 0    lidocaine (LIDODERM) 5 %, Apply patch to the affected area for 12 hours a day and remove for 12 hours a day., Disp: 30 Each, Rfl: 5    calcium acetate (PHOSLO) 667 mg cap, Take 1 Cap by mouth three (3) times daily (with meals). , Disp: , Rfl:     diclofenac (VOLTAREN) 1 % gel, Apply 2 g to affected area four (4) times daily. (Patient not taking: Reported on 11/11/2021), Disp: 50 g, Rfl: 3    predniSONE (DELTASONE) 20 mg tablet, Take 40 mg by mouth daily (with breakfast). (Patient not taking: Reported on 7/1/2021), Disp: 20 Tablet, Rfl: 0    hydrALAZINE (APRESOLINE) 25 mg tablet, Take 2 Tablets by mouth three (3) times daily. TAKE 1 TABLET BY MOUTH 3 TIMES A DAY (Patient not taking: Reported on 11/11/2021), Disp: 270 Tablet, Rfl: 4    meclizine (ANTIVERT) 25 mg tablet, TAKE 1 TABLET BY MOUTH 3 TIMES A DAY AS NEEDED FOR DIZZINESS (Patient not taking: Reported on 5/25/2021), Disp: 90 Tab, Rfl: 0    traZODone (DESYREL) 50 mg tablet, Take 1 Tab by mouth nightly. (Patient not taking: Reported on 5/25/2021), Disp: 90 Tab, Rfl: 0    calcitRIOL (ROCALTROL) 0.25 mcg capsule, Take 0.25 mcg by mouth daily. (Patient not taking: Reported on 5/25/2021), Disp: , Rfl:      No Known Allergies    Review of Systems   Constitutional: Positive for chills. Negative for fever. Respiratory: Positive for cough, sputum production and wheezing. Cardiovascular: Negative for chest pain. Gastrointestinal: Negative. Genitourinary: Negative. Physical Exam:      Visit Vitals  BP (!) 190/117 (BP 1 Location: Left upper arm, BP Patient Position: Sitting)   Pulse 87   Temp 97 °F (36.1 °C) (Temporal)   Resp 16   Ht 5' 6\" (1.676 m)   Wt 129 lb 6.4 oz (58.7 kg)   SpO2 90%   BMI 20.89 kg/m²       Physical Exam  Constitutional:       Appearance: Normal appearance. Cardiovascular:      Rate and Rhythm: Normal rate and regular rhythm. Heart sounds: Normal heart sounds. No murmur heard. No gallop. Pulmonary:      Effort: Pulmonary effort is normal. No respiratory distress. Breath sounds: Normal breath sounds. No stridor. No wheezing or rhonchi. Skin:     General: Skin is warm and dry.       Coloration: Skin is not jaundiced or pale. Neurological:      Mental Status: He is alert. Labs Reviewed:      Assessment/Plan       ICD-10-CM ICD-9-CM    1. Essential hypertension  I10 401.9 cloNIDine HCL (CATAPRES) 0.2 mg tablet   2. Cigarette smoker  F17.210 305.1 lidocaine (LIDODERM) 5 %      nicotine (NICODERM CQ) 21 mg/24 hr   3. [facility] ([facility]  N18.6 585.6    4. Bronchitis  J40 490 albuterol (PROVENTIL VENTOLIN) 2.5 mg /3 mL (0.083 %) nebu      azithromycin (ZITHROMAX) 250 mg tablet    advised stop nyquil . Increase clonidine to 2 in am and 1 in pm  (0.2 mg tabs).  Advised needs to have BP down before his teeth can be extracted         Dereje Overton MD

## 2021-11-16 ENCOUNTER — TELEPHONE (OUTPATIENT)
Dept: INTERNAL MEDICINE CLINIC | Age: 51
End: 2021-11-16

## 2021-11-22 NOTE — TELEPHONE ENCOUNTER
Attempted to call patient , but pt phone is not accepting incoming calls at this time. Need more information to complete PA for pt.

## 2022-03-03 ENCOUNTER — HOSPITAL ENCOUNTER (OUTPATIENT)
Dept: LAB | Age: 52
Discharge: HOME | End: 2022-03-03
Payer: MEDICARE

## 2022-03-03 ENCOUNTER — OFFICE VISIT (OUTPATIENT)
Dept: INTERNAL MEDICINE CLINIC | Age: 52
End: 2022-03-03
Payer: MEDICARE

## 2022-03-03 VITALS
DIASTOLIC BLOOD PRESSURE: 122 MMHG | TEMPERATURE: 97.3 F | BODY MASS INDEX: 21.38 KG/M2 | HEIGHT: 66 IN | WEIGHT: 133 LBS | SYSTOLIC BLOOD PRESSURE: 207 MMHG | RESPIRATION RATE: 18 BRPM | HEART RATE: 89 BPM | OXYGEN SATURATION: 95 %

## 2022-03-03 DIAGNOSIS — N18.6 ESRD (END STAGE RENAL DISEASE) (HCC): ICD-10-CM

## 2022-03-03 DIAGNOSIS — N62 GYNECOMASTIA, MALE: ICD-10-CM

## 2022-03-03 DIAGNOSIS — Z13.29 SCREENING FOR HYPOTHYROIDISM: ICD-10-CM

## 2022-03-03 DIAGNOSIS — N63.0 BREAST MASS IN MALE: ICD-10-CM

## 2022-03-03 DIAGNOSIS — I10 ESSENTIAL HYPERTENSION: Primary | ICD-10-CM

## 2022-03-03 DIAGNOSIS — E46 PROTEIN-CALORIE MALNUTRITION, UNSPECIFIED SEVERITY (HCC): ICD-10-CM

## 2022-03-03 LAB
HCG SERPL QL: NEGATIVE
T4 FREE SERPL-MCNC: 0.7 NG/DL (ref 0.7–1.5)
TSH SERPL DL<=0.05 MIU/L-ACNC: 1.08 UIU/ML (ref 0.36–3.74)

## 2022-03-03 PROCEDURE — G8427 DOCREV CUR MEDS BY ELIG CLIN: HCPCS | Performed by: INTERNAL MEDICINE

## 2022-03-03 PROCEDURE — 83002 ASSAY OF GONADOTROPIN (LH): CPT

## 2022-03-03 PROCEDURE — 84403 ASSAY OF TOTAL TESTOSTERONE: CPT

## 2022-03-03 PROCEDURE — G8420 CALC BMI NORM PARAMETERS: HCPCS | Performed by: INTERNAL MEDICINE

## 2022-03-03 PROCEDURE — 84703 CHORIONIC GONADOTROPIN ASSAY: CPT

## 2022-03-03 PROCEDURE — 3017F COLORECTAL CA SCREEN DOC REV: CPT | Performed by: INTERNAL MEDICINE

## 2022-03-03 PROCEDURE — G9231 DOC ESRD DIA TRANS PREG: HCPCS | Performed by: INTERNAL MEDICINE

## 2022-03-03 PROCEDURE — 99214 OFFICE O/P EST MOD 30 MIN: CPT | Performed by: INTERNAL MEDICINE

## 2022-03-03 PROCEDURE — 36415 COLL VENOUS BLD VENIPUNCTURE: CPT

## 2022-03-03 PROCEDURE — G8510 SCR DEP NEG, NO PLAN REQD: HCPCS | Performed by: INTERNAL MEDICINE

## 2022-03-03 PROCEDURE — 84439 ASSAY OF FREE THYROXINE: CPT

## 2022-03-03 RX ORDER — ACETAMINOPHEN 500 MG
TABLET ORAL
COMMUNITY

## 2022-03-03 RX ORDER — FAMOTIDINE 20 MG/1
20 TABLET, FILM COATED ORAL 2 TIMES DAILY
COMMUNITY

## 2022-03-03 RX ORDER — HYDRALAZINE HYDROCHLORIDE 25 MG/1
25 TABLET, FILM COATED ORAL 3 TIMES DAILY
COMMUNITY

## 2022-03-03 NOTE — PROGRESS NOTES
Arely Last is a 46 y.o. male (: 1970) presenting to address:    Chief Complaint   Patient presents with   Leon Mass     LT breast x three weeks                     pain scale 8/10       Vitals:    22 0805   BP: (!) 207/122   Pulse: 89   Resp: 18   Temp: 97.3 °F (36.3 °C)   TempSrc: Temporal   SpO2: 95%   Weight: 133 lb (60.3 kg)   Height: 5' 6\" (1.676 m)   PainSc:   8   PainLoc: Breast       Hearing/Vision:   No exam data present    Learning Assessment:     Learning Assessment 2019   PRIMARY LEARNER Patient   HIGHEST LEVEL OF EDUCATION - PRIMARY LEARNER  DID NOT GRADUATE HIGH SCHOOL   BARRIERS PRIMARY LEARNER NONE   CO-LEARNER CAREGIVER No   PRIMARY LANGUAGE ENGLISH   LEARNER PREFERENCE PRIMARY LISTENING   ANSWERED BY patient   RELATIONSHIP SELF     Depression Screening:     3 most recent PHQ Screens 3/3/2022   Little interest or pleasure in doing things Not at all   Feeling down, depressed, irritable, or hopeless Not at all   Total Score PHQ 2 0     Fall Risk Assessment:   No flowsheet data found. Abuse Screening:   No flowsheet data found. Coordination of Care Questionaire:     Advanced Directive:   1. Do you have an Advanced Directive? NO    2. Would you like information on Advanced Directives? NO    1. \"Have you been to the ER, urgent care clinic since your last visit? Hospitalized since your last visit? \" No    2. \"Have you seen or consulted any other health care providers outside of the [address] [place] [address] since your last visit? \" No     3. For patients aged 39-70: Has the patient had a colonoscopy? No     If the patient is female:    4. For patients aged 41-77: Has the patient had a mammogram within the past 2 years? No    5. For patients aged 21-65: Has the patient had a pap smear?  No

## 2022-03-03 NOTE — PROGRESS NOTES
Progress Note    Patient: Obed Quinn               Sex: male                  YOB: 1970      Age:  46 y.o.                    HPI:     Obed Quinn is a 46 y.o. male who has been seen for hypertension , ESRD, and a  L  painful mass  Breast x 3 weeks. got dialyzed yesterday . . he takes his clonidine after dialysis   BP is\" normal\" after dialysis . He uses tylenol for his pain   Past Medical History:   Diagnosis Date    Asthma     Chronic kidney disease        History reviewed. No pertinent surgical history. Family History   Problem Relation Age of Onset    Seizures Mother     Asthma Mother     Cancer Maternal Grandfather         throat       Social History     Socioeconomic History    Marital status:    Tobacco Use    Smoking status: Current Every Day Smoker     Packs/day: 2.00     Years: 20.00     Pack years: 40.00     Types: Cigarettes     Last attempt to quit: 2021     Years since quittin.7    Smokeless tobacco: Never Used    Tobacco comment: continue not to smoke   Vaping Use    Vaping Use: Never used   Substance and Sexual Activity    Alcohol use: No    Drug use: No    Sexual activity: Never         Current Outpatient Medications:     famotidine (PEPCID) 20 mg tablet, Take 20 mg by mouth two (2) times a day., Disp: , Rfl:     hydrALAZINE (APRESOLINE) 25 mg tablet, Take 25 mg by mouth three (3) times daily. , Disp: , Rfl:     acetaminophen (Tylenol Extra Strength) 500 mg tablet, Take  by mouth every six (6) hours as needed for Pain., Disp: , Rfl:     OTHER, , Disp: , Rfl:     lidocaine (LIDODERM) 5 %, Apply patch to the affected area for 12 hours a day and remove for 12 hours a day., Disp: 30 Each, Rfl: 5    albuterol (PROVENTIL VENTOLIN) 2.5 mg /3 mL (0.083 %) nebu, 1.5 mL by Nebulization route every six (6) hours as needed for Wheezing.  Indications: chronic obstructive pulmonary disease, Disp: 30 Nebule, Rfl: 4    cloNIDine HCL (CATAPRES) 0.2 mg tablet, Take 2 in am and one in pm, Disp: 180 Tablet, Rfl: 3    metoprolol tartrate (LOPRESSOR) 25 mg tablet, TAKE 1 TABLET BY MOUTH 2 TIMES A DAY, Disp: 180 Tab, Rfl: 0    traZODone (DESYREL) 50 mg tablet, Take 1 Tab by mouth nightly., Disp: 90 Tab, Rfl: 0    calcium acetate (PHOSLO) 667 mg cap, Take 1 Cap by mouth three (3) times daily (with meals). , Disp: , Rfl:     nicotine (NICODERM CQ) 21 mg/24 hr, 1 Patch by TransDERmal route every twenty-four (24) hours. (Patient not taking: Reported on 3/3/2022), Disp: 60 Patch, Rfl: 0    omeprazole (PRILOSEC) 40 mg capsule, TAKE 1 CAPSULE BY MOUTH ONCE DAILY, Disp: 90 Cap, Rfl: 0     No Known Allergies    Review of Systems   Constitutional: Negative for chills and fever. HENT: Positive for hearing loss. Eyes: Positive for blurred vision. Respiratory: Negative for cough and wheezing. Cardiovascular: Negative for chest pain. Gastrointestinal: Negative. Genitourinary: Negative. Neurological: Negative for dizziness and loss of consciousness. Physical Exam:      Visit Vitals  BP (!) 207/122 (BP 1 Location: Left upper arm, BP Patient Position: Sitting, BP Cuff Size: Adult)   Pulse 89   Temp 97.3 °F (36.3 °C) (Temporal)   Resp 18   Ht 5' 6\" (1.676 m)   Wt 133 lb (60.3 kg)   SpO2 95%   BMI 21.47 kg/m²       Physical Exam  Constitutional:       Appearance: Normal appearance. Cardiovascular:      Rate and Rhythm: Normal rate and regular rhythm. Pulses: Normal pulses. Heart sounds: Normal heart sounds. Pulmonary:      Effort: Pulmonary effort is normal.      Breath sounds: Normal breath sounds. Chest:          Comments: Soft subareolar mass 1.5 inches diameter  Skin:     General: Skin is warm and dry. Neurological:      Mental Status: He is alert. Labs Reviewed:      Assessment/Plan       ICD-10-CM ICD-9-CM    1. Essential hypertension  I10 401.9    2. ESR[facility]end stage renal disease) ([facility]  N18.6 585.6    3.  Gynecomastia, male  N58 611.1 gynecomastia can be related to his ESRD and dialysis   Reassured this is related to his ESRD. Advised this is  most likely not cancer . Recheck 3 months. Advised he discuss with his nephrologist .   he has been out of clonidine x 3 days  which accounts for some of his hypertension . Advised to try and not let this happen.     Isabel Wolf MD

## 2022-03-04 LAB — LH SERPL-ACNC: 11.7 MIU/ML

## 2022-03-06 LAB
TESTOST FREE SERPL-MCNC: 19.4 PG/ML (ref 7.2–24)
TESTOST SERPL-MCNC: 121 NG/DL (ref 264–916)

## 2022-03-24 ENCOUNTER — OFFICE VISIT (OUTPATIENT)
Dept: INTERNAL MEDICINE CLINIC | Age: 52
End: 2022-03-24
Payer: MEDICARE

## 2022-03-24 VITALS
WEIGHT: 129 LBS | RESPIRATION RATE: 18 BRPM | HEART RATE: 78 BPM | DIASTOLIC BLOOD PRESSURE: 100 MMHG | TEMPERATURE: 97 F | BODY MASS INDEX: 20.73 KG/M2 | OXYGEN SATURATION: 99 % | HEIGHT: 66 IN | SYSTOLIC BLOOD PRESSURE: 158 MMHG

## 2022-03-24 DIAGNOSIS — Z01.00 EYE EXAM, ROUTINE: ICD-10-CM

## 2022-03-24 DIAGNOSIS — N62 GYNECOMASTIA: ICD-10-CM

## 2022-03-24 DIAGNOSIS — L60.0 INGROWN TOENAIL: Primary | ICD-10-CM

## 2022-03-24 PROBLEM — N18.5 ANEMIA IN STAGE 5 CHRONIC KIDNEY DISEASE, NOT ON CHRONIC DIALYSIS (HCC): Status: ACTIVE | Noted: 2018-07-04

## 2022-03-24 PROBLEM — D63.1 ANEMIA IN STAGE 5 CHRONIC KIDNEY DISEASE, NOT ON CHRONIC DIALYSIS (HCC): Status: ACTIVE | Noted: 2018-07-04

## 2022-03-24 PROBLEM — N18.6 END-STAGE RENAL DISEASE ON HEMODIALYSIS (HCC): Status: ACTIVE | Noted: 2018-07-27

## 2022-03-24 PROBLEM — J96.00 ACUTE RESPIRATORY FAILURE (HCC): Status: ACTIVE | Noted: 2021-11-25

## 2022-03-24 PROBLEM — E87.5 HYPERKALEMIA: Status: ACTIVE | Noted: 2021-11-29

## 2022-03-24 PROBLEM — Z99.2 END-STAGE RENAL DISEASE ON HEMODIALYSIS (HCC): Status: ACTIVE | Noted: 2018-07-27

## 2022-03-24 PROBLEM — H91.93 BILATERAL HEARING LOSS: Status: ACTIVE | Noted: 2018-09-24

## 2022-03-24 PROBLEM — I10 ACCELERATED HYPERTENSION: Status: ACTIVE | Noted: 2018-07-04

## 2022-03-24 PROBLEM — N17.9 ACUTE RENAL FAILURE SUPERIMPOSED ON CHRONIC KIDNEY DISEASE (HCC): Status: ACTIVE | Noted: 2018-07-04

## 2022-03-24 PROBLEM — N18.9 ACUTE RENAL FAILURE SUPERIMPOSED ON CHRONIC KIDNEY DISEASE (HCC): Status: ACTIVE | Noted: 2018-07-04

## 2022-03-24 PROBLEM — E16.2 HYPOGLYCEMIA: Status: ACTIVE | Noted: 2021-11-29

## 2022-03-24 PROBLEM — R94.31 QT PROLONGATION: Status: ACTIVE | Noted: 2021-11-29

## 2022-03-24 PROBLEM — I10 BENIGN HYPERTENSION: Status: ACTIVE | Noted: 2018-07-27

## 2022-03-24 PROBLEM — R09.A2 GLOBUS SENSATION: Status: ACTIVE | Noted: 2018-07-04

## 2022-03-24 PROBLEM — R06.02 SHORTNESS OF BREATH: Status: ACTIVE | Noted: 2020-04-28

## 2022-03-24 PROBLEM — R56.9 SEIZURE (HCC): Status: ACTIVE | Noted: 2022-03-24

## 2022-03-24 PROBLEM — R41.82 ALTERED MENTAL STATUS: Status: ACTIVE | Noted: 2021-09-06

## 2022-03-24 PROBLEM — K21.9 GASTROESOPHAGEAL REFLUX DISEASE WITHOUT ESOPHAGITIS: Status: ACTIVE | Noted: 2018-07-04

## 2022-03-24 PROBLEM — R09.89 GLOBUS SENSATION: Status: ACTIVE | Noted: 2018-07-04

## 2022-03-24 PROBLEM — R42 VERTIGO: Status: ACTIVE | Noted: 2018-10-04

## 2022-03-24 PROCEDURE — G8432 DEP SCR NOT DOC, RNG: HCPCS | Performed by: INTERNAL MEDICINE

## 2022-03-24 PROCEDURE — 3017F COLORECTAL CA SCREEN DOC REV: CPT | Performed by: INTERNAL MEDICINE

## 2022-03-24 PROCEDURE — G8420 CALC BMI NORM PARAMETERS: HCPCS | Performed by: INTERNAL MEDICINE

## 2022-03-24 PROCEDURE — G8427 DOCREV CUR MEDS BY ELIG CLIN: HCPCS | Performed by: INTERNAL MEDICINE

## 2022-03-24 PROCEDURE — 99213 OFFICE O/P EST LOW 20 MIN: CPT | Performed by: INTERNAL MEDICINE

## 2022-03-24 PROCEDURE — G9231 DOC ESRD DIA TRANS PREG: HCPCS | Performed by: INTERNAL MEDICINE

## 2022-03-24 RX ORDER — LIDOCAINE AND PRILOCAINE 25; 25 MG/G; MG/G
CREAM TOPICAL
COMMUNITY
Start: 2022-02-08

## 2022-03-24 RX ORDER — HYDRALAZINE HYDROCHLORIDE 50 MG/1
50 TABLET, FILM COATED ORAL
COMMUNITY
Start: 2021-11-30 | End: 2022-08-16

## 2022-03-24 RX ORDER — LANOLIN ALCOHOL/MO/W.PET/CERES
6 CREAM (GRAM) TOPICAL
COMMUNITY
Start: 2021-11-30

## 2022-03-24 RX ORDER — LIDOCAINE AND PRILOCAINE 25; 25 MG/G; MG/G
CREAM TOPICAL
COMMUNITY

## 2022-03-24 NOTE — PROGRESS NOTES
1. \"Have you been to the ER, urgent care clinic since your last visit? Hospitalized since your last visit? \" No    2. \"Have you seen or consulted any other health care providers outside of the [address] [place] [address] since your last visit? \" No     3. For patients aged 39-70: Has the patient had a colonoscopy / FIT/ Cologuard? No      If the patient is female:    4. For patients aged 41-77: Has the patient had a mammogram within the past 2 years? NA - based on age or sex      11. For patients aged 21-65: Has the patient had a pap smear?  NA - based on age or sex

## 2022-03-24 NOTE — PROGRESS NOTES
Progress Note    Patient: Daniele Araiza               Sex: male                  YOB: 1970      Age:  46 y.o.                    HPI:     Daniele Araiza is a 46 y.o. male who has been seen for  Hypertension . He needs a referral to the dentist. He  needs a tooth pulled. He needs eye evaluation  He also has an ingrown toe nail and wants a podiatry referral     Past Medical History:   Diagnosis Date    Asthma     Chronic kidney disease        History reviewed. No pertinent surgical history. Family History   Problem Relation Age of Onset    Seizures Mother     Asthma Mother     Cancer Maternal Grandfather         throat       Social History     Socioeconomic History    Marital status:    Tobacco Use    Smoking status: Current Every Day Smoker     Packs/day: 2.00     Years: 20.00     Pack years: 40.00     Types: Cigarettes     Last attempt to quit: 2021     Years since quittin.8    Smokeless tobacco: Never Used    Tobacco comment: continue not to smoke   Vaping Use    Vaping Use: Never used   Substance and Sexual Activity    Alcohol use: No    Drug use: No    Sexual activity: Never         Current Outpatient Medications:     hydrALAZINE (APRESOLINE) 50 mg tablet, Take 50 mg by mouth., Disp: , Rfl:     melatonin 3 mg tablet, Take 6 mg by mouth nightly., Disp: , Rfl:     lidocaine-prilocaine (EMLA) topical cream, APPLY 1 AS DIRECTED TO SKIN AS DIRECTED APPLY TO L AVF LIBERALLY ONE HOUR BEFORE DIALYSIS, Disp: , Rfl:     lidocaine-prilocaine (EMLA) topical cream, Apply  to affected area., Disp: , Rfl:     famotidine (PEPCID) 20 mg tablet, Take 20 mg by mouth two (2) times a day., Disp: , Rfl:     hydrALAZINE (APRESOLINE) 25 mg tablet, Take 25 mg by mouth three (3) times daily. , Disp: , Rfl:     acetaminophen (Tylenol Extra Strength) 500 mg tablet, Take  by mouth every six (6) hours as needed for Pain., Disp: , Rfl:     OTHER, , Disp: , Rfl:     lidocaine (LIDODERM) 5 %, Apply patch to the affected area for 12 hours a day and remove for 12 hours a day., Disp: 30 Each, Rfl: 5    albuterol (PROVENTIL VENTOLIN) 2.5 mg /3 mL (0.083 %) nebu, 1.5 mL by Nebulization route every six (6) hours as needed for Wheezing. Indications: chronic obstructive pulmonary disease, Disp: 30 Nebule, Rfl: 4    cloNIDine HCL (CATAPRES) 0.2 mg tablet, Take 2 in am and one in pm, Disp: 180 Tablet, Rfl: 3    metoprolol tartrate (LOPRESSOR) 25 mg tablet, TAKE 1 TABLET BY MOUTH 2 TIMES A DAY, Disp: 180 Tab, Rfl: 0    omeprazole (PRILOSEC) 40 mg capsule, TAKE 1 CAPSULE BY MOUTH ONCE DAILY, Disp: 90 Cap, Rfl: 0    traZODone (DESYREL) 50 mg tablet, Take 1 Tab by mouth nightly., Disp: 90 Tab, Rfl: 0    calcium acetate (PHOSLO) 667 mg cap, Take 1 Cap by mouth three (3) times daily (with meals). , Disp: , Rfl:     nicotine (NICODERM CQ) 21 mg/24 hr, 1 Patch by TransDERmal route every twenty-four (24) hours. (Patient not taking: Reported on 3/3/2022), Disp: 60 Patch, Rfl: 0     No Known Allergies    Review of Systems   Constitutional: Negative for chills and fever. Respiratory: Negative for cough. Cardiovascular: Negative for chest pain. Gastrointestinal: Negative. Genitourinary: Negative. Neurological: Negative for dizziness. Physical Exam:      Visit Vitals  BP (!) 158/100 (BP 1 Location: Right arm, BP Patient Position: Sitting, BP Cuff Size: Adult)   Pulse 78   Temp 97 °F (36.1 °C) (Temporal)   Resp 18   Ht 5' 6\" (1.676 m)   Wt 129 lb (58.5 kg)   SpO2 99%   BMI 20.82 kg/m²       Physical Exam  HENT:      Head: Normocephalic and atraumatic. Cardiovascular:      Rate and Rhythm: Normal rate and regular rhythm. Heart sounds: No murmur heard. No friction rub. No gallop. Pulmonary:      Effort: Pulmonary effort is normal.      Breath sounds: Normal breath sounds. Chest:       Musculoskeletal:      Comments: Tender great toe medially    Skin:     General: Skin is warm and dry. Neurological:      General: No focal deficit present. Mental Status: He is oriented to person, place, and time. Labs Reviewed:      Assessment/Plan       ICD-10-CM ICD-9-CM    1.  Ingrown toenail  L60.0 703.0 REFERRAL TO PODIATRY   2. Eye exam, routine  Z01.00 V72.0 REFERRAL TO OPHTHALMOLOGY   3. Gynecomastia  N62 611.1 KARINA MAMMO LT DX INCL CAD             Sam Palma MD

## 2022-08-16 ENCOUNTER — OFFICE VISIT (OUTPATIENT)
Dept: INTERNAL MEDICINE CLINIC | Age: 52
End: 2022-08-16
Payer: MEDICARE

## 2022-08-16 VITALS
DIASTOLIC BLOOD PRESSURE: 95 MMHG | SYSTOLIC BLOOD PRESSURE: 172 MMHG | RESPIRATION RATE: 18 BRPM | WEIGHT: 136.8 LBS | HEIGHT: 66 IN | HEART RATE: 69 BPM | TEMPERATURE: 97.3 F | BODY MASS INDEX: 21.98 KG/M2 | OXYGEN SATURATION: 96 %

## 2022-08-16 DIAGNOSIS — Z79.899 ENCOUNTER FOR LONG-TERM (CURRENT) USE OF MEDICATIONS: ICD-10-CM

## 2022-08-16 DIAGNOSIS — I15.1 HYPERTENSION SECONDARY TO OTHER RENAL DISORDERS: ICD-10-CM

## 2022-08-16 DIAGNOSIS — Z01.00 EYE EXAM, ROUTINE: ICD-10-CM

## 2022-08-16 DIAGNOSIS — N28.89 HYPERTENSION SECONDARY TO OTHER RENAL DISORDERS: ICD-10-CM

## 2022-08-16 DIAGNOSIS — Z00.00 MEDICARE ANNUAL WELLNESS VISIT, SUBSEQUENT: Primary | ICD-10-CM

## 2022-08-16 DIAGNOSIS — L84 PRE-ULCERATIVE CORN OR CALLOUS: ICD-10-CM

## 2022-08-16 PROCEDURE — G0439 PPPS, SUBSEQ VISIT: HCPCS | Performed by: INTERNAL MEDICINE

## 2022-08-16 PROCEDURE — 3017F COLORECTAL CA SCREEN DOC REV: CPT | Performed by: INTERNAL MEDICINE

## 2022-08-16 PROCEDURE — G8420 CALC BMI NORM PARAMETERS: HCPCS | Performed by: INTERNAL MEDICINE

## 2022-08-16 PROCEDURE — G8427 DOCREV CUR MEDS BY ELIG CLIN: HCPCS | Performed by: INTERNAL MEDICINE

## 2022-08-16 PROCEDURE — G8510 SCR DEP NEG, NO PLAN REQD: HCPCS | Performed by: INTERNAL MEDICINE

## 2022-08-16 PROCEDURE — 99213 OFFICE O/P EST LOW 20 MIN: CPT | Performed by: INTERNAL MEDICINE

## 2022-08-16 PROCEDURE — G9231 DOC ESRD DIA TRANS PREG: HCPCS | Performed by: INTERNAL MEDICINE

## 2022-08-16 RX ORDER — HYDRALAZINE HYDROCHLORIDE 50 MG/1
50 TABLET, FILM COATED ORAL 3 TIMES DAILY
Qty: 180 TABLET | Refills: 4 | Status: SHIPPED | OUTPATIENT
Start: 2022-08-16

## 2022-08-16 RX ORDER — METOPROLOL TARTRATE 25 MG/1
TABLET, FILM COATED ORAL
Qty: 180 TABLET | Refills: 5 | Status: SHIPPED | OUTPATIENT
Start: 2022-08-16

## 2022-08-16 RX ORDER — CALCIUM ACETATE 667 MG/1
1 CAPSULE ORAL
Qty: 180 CAPSULE | Refills: 5 | Status: SHIPPED | OUTPATIENT
Start: 2022-08-16

## 2022-08-16 NOTE — PROGRESS NOTES
1. \"Have you been to the ER, urgent care clinic since your last visit? Hospitalized since your last visit? \" No    2. \"Have you seen or consulted any other health care providers outside of the [address] [place] [address] since your last visit? \" No     3. For patients aged 39-70: Has the patient had a colonoscopy / FIT/ Cologuard? No      If the patient is female:    4. For patients aged 41-77: Has the patient had a mammogram within the past 2 years? No      5. For patients aged 21-65: Has the patient had a pap smear?  NA - based on age or sex

## 2022-08-16 NOTE — PATIENT INSTRUCTIONS
Medicare Wellness Visit, Male    The best way to live healthy is to have a lifestyle where you eat a well-balanced diet, exercise regularly, limit alcohol use, and quit all forms of tobacco/nicotine, if applicable. Regular preventive services are another way to keep healthy. Preventive services (vaccines, screening tests, monitoring & exams) can help personalize your care plan, which helps you manage your own care. Screening tests can find health problems at the earliest stages, when they are easiest to treat. [facility] follows the current, evidence-based guidelines published by the [facility] ([facility]) when recommending preventive services for our patients. Because we follow these guidelines, sometimes recommendations change over time as research supports it. (For example, a prostate screening blood test is no longer routinely recommended for men with no symptoms). Of course, you and your doctor may decide to screen more often for some diseases, based on your risk and co-morbidities (chronic disease you are already diagnosed with). Preventive services for you include:  - Medicare offers their members a free annual wellness visit, which is time for you and your primary care provider to discuss and plan for your preventive service needs. Take advantage of this benefit every year!  -All adults over age 72 should receive the recommended pneumonia vaccines. Current USPSTF guidelines recommend a series of two vaccines for the best pneumonia protection.   -All adults should have a flu vaccine yearly and tetanus vaccine every 10 years.  -All adults age 48 and older should receive the shingles vaccines (series of two vaccines).        -All adults age 38-68 who are overweight should have a diabetes screening test once every three years.   -Other screening tests & preventive services for persons with diabetes include: an eye exam to screen for diabetic retinopathy, a kidney function test, a foot exam, and stricter control over your cholesterol.   -Cardiovascular screening for adults with routine risk involves an electrocardiogram (ECG) at intervals determined by the provider.   -Colorectal cancer screening should be done for adults age 54-65 with no increased risk factors for colorectal cancer. There are a number of acceptable methods of screening for this type of cancer. Each test has its own benefits and drawbacks. Discuss with your provider what is most appropriate for you during your annual wellness visit. The different tests include: colonoscopy (considered the best screening method), a fecal occult blood test, a fecal DNA test, and sigmoidoscopy.  -All adults born between [facility] should be screened once for Hepatitis C.  -An Abdominal Aortic Aneurysm (AAA) Screening is recommended for men age 73-68 who has ever smoked in their lifetime.      Here is a list of your current Health Maintenance items (your personalized list of preventive services) with a due date:  Health Maintenance Due   Topic Date Due    Pneumococcal Vaccine (1 - PCV) Never done    Shingles Vaccine (1 of 2) Never done    DTaP/Tdap/Td  (1 - Tdap) Never done    Colorectal Screening  Never done    Smoker or Former Smoker - [address]  Never done    COVID-19 Vaccine (2 - [facility] Lust series) 12/02/2021

## 2022-08-16 NOTE — PROGRESS NOTES
Progress Note    Patient: Anny Dotson               Sex: male                  YOB: 1970      Age:  46 y.o.                    HPI:     Anny Dotson is a 46 y.o. male who has been seen for pain in his foot and for hypertension . Past Medical History:   Diagnosis Date    Asthma     Chronic kidney disease        History reviewed. No pertinent surgical history. Family History   Problem Relation Age of Onset    Seizures Mother     Asthma Mother     Cancer Maternal Grandfather         throat       Social History     Socioeconomic History    Marital status:    Tobacco Use    Smoking status: Every Day     Packs/day: 2.00     Years: 20.00     Pack years: 40.00     Types: Cigarettes     Last attempt to quit: 2021     Years since quittin.2    Smokeless tobacco: Never    Tobacco comments:     continue not to smoke   Vaping Use    Vaping Use: Never used   Substance and Sexual Activity    Alcohol use: No    Drug use: No    Sexual activity: Never     Social Determinants of Health     Financial Resource Strain: Low Risk     Difficulty of Paying Living Expenses: Not hard at all   Food Insecurity: No Food Insecurity    Worried About Running Out of Food in the Last Year: Never true    Ran Out of Food in the Last Year: Never true         Current Outpatient Medications:     hydrALAZINE (APRESOLINE) 50 mg tablet, Take 50 mg by mouth., Disp: , Rfl:     melatonin 3 mg tablet, Take 6 mg by mouth nightly., Disp: , Rfl:     lidocaine-prilocaine (EMLA) topical cream, APPLY 1 AS DIRECTED TO SKIN AS DIRECTED APPLY TO L AVF LIBERALLY ONE HOUR BEFORE DIALYSIS, Disp: , Rfl:     lidocaine-prilocaine (EMLA) topical cream, Apply  to affected area., Disp: , Rfl:     famotidine (PEPCID) 20 mg tablet, Take 20 mg by mouth two (2) times a day., Disp: , Rfl:     hydrALAZINE (APRESOLINE) 25 mg tablet, Take 25 mg by mouth three (3) times daily. , Disp: , Rfl:     acetaminophen (TYLENOL) 500 mg tablet, Take  by mouth every six (6) hours as needed for Pain., Disp: , Rfl:     OTHER, , Disp: , Rfl:     lidocaine (LIDODERM) 5 %, Apply patch to the affected area for 12 hours a day and remove for 12 hours a day., Disp: 30 Each, Rfl: 5    albuterol (PROVENTIL VENTOLIN) 2.5 mg /3 mL (0.083 %) nebu, 1.5 mL by Nebulization route every six (6) hours as needed for Wheezing. Indications: chronic obstructive pulmonary disease, Disp: 30 Nebule, Rfl: 4    cloNIDine HCL (CATAPRES) 0.2 mg tablet, Take 2 in am and one in pm, Disp: 180 Tablet, Rfl: 3    metoprolol tartrate (LOPRESSOR) 25 mg tablet, TAKE 1 TABLET BY MOUTH 2 TIMES A DAY, Disp: 180 Tab, Rfl: 0    omeprazole (PRILOSEC) 40 mg capsule, TAKE 1 CAPSULE BY MOUTH ONCE DAILY, Disp: 90 Cap, Rfl: 0    traZODone (DESYREL) 50 mg tablet, Take 1 Tab by mouth nightly., Disp: 90 Tab, Rfl: 0    calcium acetate (PHOSLO) 667 mg cap, Take 1 Cap by mouth three (3) times daily (with meals). , Disp: , Rfl:      No Known Allergies    Review of Systems   Constitutional:  Negative for chills and fever. Eyes:  Positive for blurred vision. Due to see eye DR    Respiratory:  Negative for cough and shortness of breath. Cardiovascular:  Negative for chest pain. Gastrointestinal: Negative. Genitourinary: Negative. Neurological:  Negative for dizziness and loss of consciousness. Physical Exam:      Visit Vitals  BP (!) 172/95 (BP 1 Location: Left upper arm, BP Patient Position: Sitting, BP Cuff Size: Small adult)   Pulse 69   Temp 97.3 °F (36.3 °C) (Temporal)   Resp 18   Ht 5' 6\" (1.676 m)   Wt 136 lb 12.8 oz (62.1 kg)   SpO2 96%   BMI 22.08 kg/m²       Physical Exam  Constitutional:       Appearance: Normal appearance. Cardiovascular:      Rate and Rhythm: Normal rate and regular rhythm. Pulses: Normal pulses. Heart sounds: Normal heart sounds. Pulmonary:      Effort: Pulmonary effort is normal. No respiratory distress. Breath sounds: Normal breath sounds. No stridor.  No wheezing or rhonchi. Skin:     General: Skin is warm and dry. Comments: Has callous lateral border  Rt foot plantar surface   Neurological:      General: No focal deficit present. Mental Status: He is alert and oriented to person, place, and time. Labs Reviewed:      Assessment/Plan       ICD-10-CM ICD-9-CM    1. Medicare annual wellness visit, subsequent  Z00.00 V70.0       2. Eye exam, routine  Z01.00 V72.0 REFERRAL TO OPHTHALMOLOGY      3. Pre-ulcerative corn or callous  L84 700 REFERRAL TO PODIATRY      4. Encounter for long-term (current) use of medications  Z79.899 V58.69       5. Hypertension secondary to other renal disorders  I15.1 405.91     N28.89 593.89                 Mariam Neri MD    This is the Subsequent Medicare Annual Wellness Exam, performed 12 months or more after the Initial AWV or the last Subsequent AWV    I have reviewed the patient's medical history in detail and updated the computerized patient record. Assessment/Plan   Education and counseling provided:  Are appropriate based on today's review and evaluation    1. Medicare annual wellness visit, subsequent     Depression Risk Factor Screening     3 most recent PHQ Screens 3/3/2022   Little interest or pleasure in doing things Not at all   Feeling down, depressed, irritable, or hopeless Not at all   Total Score PHQ 2 0       Alcohol & Drug Abuse Risk Screen    Do you average more than 2 drinks per night or 14 drinks a week: No    On any one occasion in the past three months have you have had more than 4 drinks containing alcohol:  No          Functional Ability and Level of Safety    Hearing: The patient wears hearing aids. Activities of Daily Living:   The home contains: handrails  Patient needs help with:  shopping, preparing meals, laundry, housework, managing medications, bathing, hygiene, and bathroom needs      Ambulation: with difficulty, uses a cane     Fall Risk:  Fall Risk Assessment, last 12 mths 3/24/2022 Able to walk? Yes   Fall in past 12 months? 0   Do you feel unsteady? 0   Are you worried about falling 1      Abuse Screen:  Patient is not abused       Cognitive Screening    Has your family/caregiver stated any concerns about your memory: no     Cognitive Screening: Normal - Mini Cog Test    Health Maintenance Due     Health Maintenance Due   Topic Date Due    Pneumococcal 0-64 years (1 - PCV) Never done    Shingrix Vaccine Age 50> (1 of 2) Never done    DTaP/Tdap/Td series (1 - Tdap) Never done    Colorectal Cancer Screening Combo  Never done    Low dose CT lung screening  Never done    COVID-19 Vaccine (2 - Linette Hammed series) 12/02/2021       Patient Care Team   Patient Care Team:  Joann Rivera MD as PCP - General (Internal Medicine Physician)  Joann Rivera MD as PCP - [facility] Provider    History     Patient Active Problem List   Diagnosis Code    Vertigo R42    Shortness of breath R06.02    Seizure ([facility]) R56.9    QT prolongation R94.31    Hypoglycemia E16.2    Hyperkalemia E87.5    Globus sensation R09.89    Gastroesophageal reflux disease without esophagitis K21.9    End-stage renal disease on hemodialysis ([address] N18.6, Z99.2    Bilateral hearing loss H91.93    Benign hypertension I10    Atypical chest pain R07.89    Anemia in stage 5 chronic kidney disease, not on chronic dialysis ([address] N18.5, D63.1    Altered mental status R41.82    Acute respiratory failure ([address] J96.00    Acute renal failure superimposed on chronic kidney disease (HCC) N17.9, N18.9    Accelerated hypertension I10     Past Medical History:   Diagnosis Date    Asthma     Chronic kidney disease       History reviewed. No pertinent surgical history. Current Outpatient Medications   Medication Sig Dispense Refill    hydrALAZINE (APRESOLINE) 50 mg tablet Take 50 mg by mouth.      melatonin 3 mg tablet Take 6 mg by mouth nightly.       lidocaine-prilocaine (EMLA) topical cream APPLY 1 AS DIRECTED TO SKIN AS DIRECTED APPLY TO L AVF LIBERALLY ONE HOUR BEFORE DIALYSIS      lidocaine-prilocaine (EMLA) topical cream Apply  to affected area. famotidine (PEPCID) 20 mg tablet Take 20 mg by mouth two (2) times a day. hydrALAZINE (APRESOLINE) 25 mg tablet Take 25 mg by mouth three (3) times daily. acetaminophen (TYLENOL) 500 mg tablet Take  by mouth every six (6) hours as needed for Pain. OTHER       lidocaine (LIDODERM) 5 % Apply patch to the affected area for 12 hours a day and remove for 12 hours a day. 30 Each 5    albuterol (PROVENTIL VENTOLIN) 2.5 mg /3 mL (0.083 %) nebu 1.5 mL by Nebulization route every six (6) hours as needed for Wheezing. Indications: chronic obstructive pulmonary disease 30 Nebule 4    cloNIDine HCL (CATAPRES) 0.2 mg tablet Take 2 in am and one in pm 180 Tablet 3    metoprolol tartrate (LOPRESSOR) 25 mg tablet TAKE 1 TABLET BY MOUTH 2 TIMES A  Tab 0    omeprazole (PRILOSEC) 40 mg capsule TAKE 1 CAPSULE BY MOUTH ONCE DAILY 90 Cap 0    traZODone (DESYREL) 50 mg tablet Take 1 Tab by mouth nightly. 90 Tab 0    calcium acetate (PHOSLO) 667 mg cap Take 1 Cap by mouth three (3) times daily (with meals).        No Known Allergies    Family History   Problem Relation Age of Onset    Seizures Mother     Asthma Mother     Cancer Maternal Grandfather         throat     Social History     Tobacco Use    Smoking status: Every Day     Packs/day: 2.00     Years: 20.00     Pack years: 40.00     Types: Cigarettes     Last attempt to quit: 2021     Years since quittin.2    Smokeless tobacco: Never    Tobacco comments:     continue not to smoke   Substance Use Topics    Alcohol use: No         Ton Gleason MD

## 2023-02-14 ENCOUNTER — OFFICE VISIT (OUTPATIENT)
Facility: CLINIC | Age: 53
End: 2023-02-14
Payer: MEDICARE

## 2023-02-14 VITALS
DIASTOLIC BLOOD PRESSURE: 78 MMHG | HEIGHT: 66 IN | SYSTOLIC BLOOD PRESSURE: 125 MMHG | WEIGHT: 128 LBS | RESPIRATION RATE: 18 BRPM | HEART RATE: 59 BPM | BODY MASS INDEX: 20.57 KG/M2 | OXYGEN SATURATION: 95 %

## 2023-02-14 DIAGNOSIS — L08.9 WOUND INFECTION: Primary | ICD-10-CM

## 2023-02-14 DIAGNOSIS — T14.8XXA WOUND INFECTION: Primary | ICD-10-CM

## 2023-02-14 DIAGNOSIS — I10 PRIMARY HYPERTENSION: ICD-10-CM

## 2023-02-14 DIAGNOSIS — N18.6 END STAGE RENAL DISEASE (HCC): ICD-10-CM

## 2023-02-14 PROCEDURE — 99214 OFFICE O/P EST MOD 30 MIN: CPT | Performed by: INTERNAL MEDICINE

## 2023-02-14 PROCEDURE — 3078F DIAST BP <80 MM HG: CPT | Performed by: INTERNAL MEDICINE

## 2023-02-14 PROCEDURE — 3074F SYST BP LT 130 MM HG: CPT | Performed by: INTERNAL MEDICINE

## 2023-02-14 RX ORDER — HYDRALAZINE HYDROCHLORIDE 25 MG/1
100 TABLET, FILM COATED ORAL 3 TIMES DAILY
Qty: 180 TABLET | Refills: 5 | Status: SHIPPED | OUTPATIENT
Start: 2023-02-14

## 2023-02-14 RX ORDER — ACETAMINOPHEN 500 MG
500 TABLET ORAL 4 TIMES DAILY PRN
Qty: 120 TABLET | Refills: 5 | Status: SHIPPED | OUTPATIENT
Start: 2023-02-14

## 2023-02-14 RX ORDER — AMOXICILLIN AND CLAVULANATE POTASSIUM 875; 125 MG/1; MG/1
1 TABLET, FILM COATED ORAL 2 TIMES DAILY
Qty: 14 TABLET | Refills: 0 | Status: SHIPPED | OUTPATIENT
Start: 2023-02-14 | End: 2023-02-21

## 2023-02-14 RX ORDER — LABETALOL 200 MG/1
200 TABLET, FILM COATED ORAL 3 TIMES DAILY
Qty: 180 TABLET | Refills: 3 | Status: SHIPPED | OUTPATIENT
Start: 2023-02-14

## 2023-02-14 SDOH — ECONOMIC STABILITY: FOOD INSECURITY: WITHIN THE PAST 12 MONTHS, YOU WORRIED THAT YOUR FOOD WOULD RUN OUT BEFORE YOU GOT MONEY TO BUY MORE.: NEVER TRUE

## 2023-02-14 SDOH — ECONOMIC STABILITY: FOOD INSECURITY: WITHIN THE PAST 12 MONTHS, THE FOOD YOU BOUGHT JUST DIDN'T LAST AND YOU DIDN'T HAVE MONEY TO GET MORE.: NEVER TRUE

## 2023-02-14 SDOH — ECONOMIC STABILITY: HOUSING INSECURITY
IN THE LAST 12 MONTHS, WAS THERE A TIME WHEN YOU DID NOT HAVE A STEADY PLACE TO SLEEP OR SLEPT IN A SHELTER (INCLUDING NOW)?: YES

## 2023-02-14 SDOH — ECONOMIC STABILITY: INCOME INSECURITY: HOW HARD IS IT FOR YOU TO PAY FOR THE VERY BASICS LIKE FOOD, HOUSING, MEDICAL CARE, AND HEATING?: NOT HARD AT ALL

## 2023-02-14 ASSESSMENT — PATIENT HEALTH QUESTIONNAIRE - PHQ9
2. FEELING DOWN, DEPRESSED OR HOPELESS: 0
SUM OF ALL RESPONSES TO PHQ QUESTIONS 1-9: 0
1. LITTLE INTEREST OR PLEASURE IN DOING THINGS: 0
SUM OF ALL RESPONSES TO PHQ9 QUESTIONS 1 & 2: 0
SUM OF ALL RESPONSES TO PHQ QUESTIONS 1-9: 0

## 2023-02-14 NOTE — PROGRESS NOTES
ROOM # 14  Identified pt with two pt identifiers(name and ). Reviewed record in preparation for visit and have obtained necessary documentation. Chief Complaint   Patient presents with    Follow-Up from [facility] preferred language for health care discussion is english/other. Is the patient using any DME equipment during OV? no    Cheyanne Pagan III is due for:  Health Maintenance Due   Topic    Pneumococcal 0-64 years Vaccine (1 - PCV)    DTaP/Tdap/Td vaccine (1 - Tdap)    Shingles vaccine (1 of 2)    Hepatitis B vaccine (1 of 3 - Risk Dialysis 4-dose series)    Colorectal Cancer Screen     Low dose CT lung screening     COVID-19 Vaccine (4 - Booster for Moderna series)    Flu vaccine (1)     Health Maintenance reviewed and discussed per provider  Please order/place referral if appropriate. 1. For patients aged 39-70: Has the patient had a colonoscopy? N/A   If the patient is female:    2. For patients aged 41-77: Has the patient had a mammogram within the past 2 years? N/A    3. For patients aged 21-65: Has the patient had a pap smear? N/A  Advance Directive:  1. Do you have an advance directive in place? Patient Reply:N/A    2. If not, would you like material regarding how to put one in place? N/A    Coordination of Care:  1. Have you been to the ER, urgent care clinic since your last visit? Hospitalized since your last visit? yes    2. Have you seen or consulted any other health care providers outside of the [facility] since your last visit? Include any pap smears or colon screening. yes    Patient is accompanied by mother  I have received verbal consent from Cheyanne Pagan III to discuss any/all medical information while they are present in the room.       Recent Travel Screening and Travel History documentation     Travel Screening     No screening recorded since 23 0000       Travel History   Travel since 23    No documented travel since 23

## 2023-02-14 NOTE — PROGRESS NOTES
Progress Note    Patient: Jovanni Louis III               Sex: male                  YOB: 1970      Age:  45 y. o.                    HPI:     Jovanni Louis III is a 45 y.o. male who has been seen for hospital followup  for cellulitus of hand. Possible animal bite . He had three weeks of antibiotics . He also had severe testicular swelling and subsequent  skin sloughing of the skin of his scrotum . He is due  to see Derm on . He also has an ID  appt . Past Medical History:   Diagnosis Date    Asthma     Chronic kidney disease        No past surgical history on file.     Family History   Problem Relation Age of Onset    Asthma Mother     Seizures Mother     Cancer Maternal Grandfather         throat       Social History     Socioeconomic History    Marital status:      Spouse name: None    Number of children: None    Years of education: None    Highest education level: None   Tobacco Use    Smoking status: Every Day     Packs/day: 2.00     Years: 20.00     Pack years: 40.00     Types: Cigarettes     Last attempt to quit: 2021     Years since quittin.7    Smokeless tobacco: Never   Substance and Sexual Activity    Alcohol use: No    Drug use: No     Social Determinants of Health     Financial Resource Strain: Low Risk     Difficulty of Paying Living Expenses: Not hard at all   Food Insecurity: No Food Insecurity    Worried About Running Out of Food in the Last Year: Never true    Ran Out of Food in the Last Year: Never true   Transportation Needs: Unmet Transportation Needs    Lack of Transportation (Non-Medical): Yes   Housing Stability: High Risk    Unstable Housing in the Last Year: Yes         Current Outpatient Medications:     acetaminophen (TYLENOL) 500 MG tablet, Take by mouth every 6 hours as needed, Disp: , Rfl:     albuterol (PROVENTIL) (2.5 MG/3ML) 0.083% nebulizer solution, Inhale 1.25 mg into the lungs every 6 hours as needed, Disp: , Rfl:     calcium acetate (PHOSLO) 667 MG CAPS capsule, Take 667 mg by mouth 3 times daily (with meals), Disp: , Rfl:     cloNIDine (CATAPRES) 0.2 MG tablet, Take 2 in am and one in pm, Disp: , Rfl:     famotidine (PEPCID) 20 MG tablet, Take 20 mg by mouth 2 times daily, Disp: , Rfl:     hydrALAZINE (APRESOLINE) 25 MG tablet, Take 25 mg by mouth 3 times daily, Disp: , Rfl:     hydrALAZINE (APRESOLINE) 50 MG tablet, Take 50 mg by mouth 3 times daily, Disp: , Rfl:     lidocaine (LIDODERM) 5 %, Apply patch to the affected area for 12 hours a day and remove for 12 hours a day., Disp: , Rfl:     lidocaine-prilocaine (EMLA) 2.5-2.5 % cream, Apply topically, Disp: , Rfl:     melatonin 3 MG TABS tablet, Take 6 mg by mouth, Disp: , Rfl:     metoprolol tartrate (LOPRESSOR) 25 MG tablet, TAKE 1 TABLET BY MOUTH 2 TIMES A DAY, Disp: , Rfl:     omeprazole (PRILOSEC) 40 MG delayed release capsule, TAKE 1 CAPSULE BY MOUTH ONCE DAILY, Disp: , Rfl:     traZODone (DESYREL) 50 MG tablet, Take 50 mg by mouth, Disp: , Rfl:      No Known Allergies    Review of Systems   Constitutional:  Positive for unexpected weight change. Cardiovascular:  Negative for chest pain and leg swelling. Genitourinary: Negative. Neurological:  Negative for dizziness and syncope. Physical Exam:      Visit Vitals  /78 (Site: Left Upper Arm, Position: Sitting)   Pulse 59   Resp 18   Ht 5' 6\" (1.676 m)   Wt 128 lb (58.1 kg)   SpO2 95%   BMI 20.66 kg/m²       Physical Exam  Constitutional:       Appearance: Normal appearance. Cardiovascular:      Pulses: Normal pulses. Heart sounds: Normal heart sounds. Pulmonary:      Effort: Pulmonary effort is normal.      Breath sounds: Normal breath sounds. Skin:     Findings: Lesion present. Comments: Large area of skin loss and subcutaneous  tissue loss Rt side of testicle and shaft of penis    Neurological:      Mental Status: He is alert. Labs Reviewed:      Assessment/Plan      Diagnosis Orders   1.  Wound infection  amoxicillin-clavulanate (AUGMENTIN) 875-125 MG per tablet    acetaminophen (TYLENOL) 500 MG tablet    External Referral To Wound Clinic      2. Primary hypertension  labetalol (NORMODYNE) 200 MG tablet    hydrALAZINE (APRESOLINE) 25 MG tablet      3. End stage renal disease ([facility]        Will refer to wound   clinic. No follow-up provider specified.      Megan Higuera MD

## 2023-02-23 ENCOUNTER — TELEPHONE (OUTPATIENT)
Facility: CLINIC | Age: 53
End: 2023-02-23

## 2025-02-26 NOTE — TELEPHONE ENCOUNTER
Patient is requesting a refill of his medication. Requested Prescriptions     Pending Prescriptions Disp Refills    calcitRIOL (ROCALTROL) 0.25 mcg capsule       Sig: Take 1 Cap by mouth daily.  calcium acetate (PHOSLO) 667 mg cap       Sig: Take  by mouth three (3) times daily (with meals). ABDOMINAL PAIN/DIARRHEA/NAUSEA/VOMITING